# Patient Record
Sex: MALE | Race: WHITE | Employment: FULL TIME | URBAN - METROPOLITAN AREA
[De-identification: names, ages, dates, MRNs, and addresses within clinical notes are randomized per-mention and may not be internally consistent; named-entity substitution may affect disease eponyms.]

---

## 2021-01-14 ENCOUNTER — HOSPITAL ENCOUNTER (EMERGENCY)
Age: 51
Discharge: HOSPICE | End: 2021-01-15
Attending: EMERGENCY MEDICINE | Admitting: ORTHOPAEDIC SURGERY
Payer: COMMERCIAL

## 2021-01-14 ENCOUNTER — APPOINTMENT (OUTPATIENT)
Dept: CT IMAGING | Age: 51
End: 2021-01-14
Payer: COMMERCIAL

## 2021-01-14 DIAGNOSIS — L03.113 CELLULITIS OF HAND, RIGHT: Primary | ICD-10-CM

## 2021-01-14 PROBLEM — L03.90 CELLULITIS: Status: ACTIVE | Noted: 2021-01-14

## 2021-01-14 LAB
ALBUMIN SERPL-MCNC: 4.2 G/DL (ref 3.5–5.2)
ALP BLD-CCNC: 136 U/L (ref 40–129)
ALT SERPL-CCNC: 18 U/L (ref 0–40)
ANION GAP SERPL CALCULATED.3IONS-SCNC: 13 MMOL/L (ref 7–16)
AST SERPL-CCNC: 14 U/L (ref 0–39)
BASOPHILS ABSOLUTE: 0.05 E9/L (ref 0–0.2)
BASOPHILS RELATIVE PERCENT: 0.4 % (ref 0–2)
BILIRUB SERPL-MCNC: 0.4 MG/DL (ref 0–1.2)
BUN BLDV-MCNC: 24 MG/DL (ref 6–20)
C-REACTIVE PROTEIN: 22.2 MG/DL (ref 0–0.4)
CALCIUM SERPL-MCNC: 10.1 MG/DL (ref 8.6–10.2)
CHLORIDE BLD-SCNC: 103 MMOL/L (ref 98–107)
CO2: 23 MMOL/L (ref 22–29)
CREAT SERPL-MCNC: 0.8 MG/DL (ref 0.7–1.2)
EOSINOPHILS ABSOLUTE: 0.12 E9/L (ref 0.05–0.5)
EOSINOPHILS RELATIVE PERCENT: 0.9 % (ref 0–6)
GFR AFRICAN AMERICAN: >60
GFR NON-AFRICAN AMERICAN: >60 ML/MIN/1.73
GLUCOSE BLD-MCNC: 215 MG/DL (ref 74–99)
HCT VFR BLD CALC: 43.4 % (ref 37–54)
HEMOGLOBIN: 14.7 G/DL (ref 12.5–16.5)
IMMATURE GRANULOCYTES #: 0.13 E9/L
IMMATURE GRANULOCYTES %: 1 % (ref 0–5)
LYMPHOCYTES ABSOLUTE: 1.83 E9/L (ref 1.5–4)
LYMPHOCYTES RELATIVE PERCENT: 13.7 % (ref 20–42)
MCH RBC QN AUTO: 28.3 PG (ref 26–35)
MCHC RBC AUTO-ENTMCNC: 33.9 % (ref 32–34.5)
MCV RBC AUTO: 83.6 FL (ref 80–99.9)
MONOCYTES ABSOLUTE: 1.04 E9/L (ref 0.1–0.95)
MONOCYTES RELATIVE PERCENT: 7.8 % (ref 2–12)
NEUTROPHILS ABSOLUTE: 10.2 E9/L (ref 1.8–7.3)
NEUTROPHILS RELATIVE PERCENT: 76.2 % (ref 43–80)
PDW BLD-RTO: 14 FL (ref 11.5–15)
PLATELET # BLD: 294 E9/L (ref 130–450)
PMV BLD AUTO: 9.3 FL (ref 7–12)
POTASSIUM SERPL-SCNC: 4.1 MMOL/L (ref 3.5–5)
RBC # BLD: 5.19 E12/L (ref 3.8–5.8)
SODIUM BLD-SCNC: 139 MMOL/L (ref 132–146)
TOTAL PROTEIN: 8.6 G/DL (ref 6.4–8.3)
URIC ACID, SERUM: 4.9 MG/DL (ref 3.4–7)
WBC # BLD: 13.4 E9/L (ref 4.5–11.5)

## 2021-01-14 PROCEDURE — 86140 C-REACTIVE PROTEIN: CPT

## 2021-01-14 PROCEDURE — 2500000003 HC RX 250 WO HCPCS: Performed by: EMERGENCY MEDICINE

## 2021-01-14 PROCEDURE — 96375 TX/PRO/DX INJ NEW DRUG ADDON: CPT | Performed by: EMERGENCY MEDICINE

## 2021-01-14 PROCEDURE — 99285 EMERGENCY DEPT VISIT HI MDM: CPT

## 2021-01-14 PROCEDURE — 2580000003 HC RX 258: Performed by: EMERGENCY MEDICINE

## 2021-01-14 PROCEDURE — 84550 ASSAY OF BLOOD/URIC ACID: CPT

## 2021-01-14 PROCEDURE — 73202 CT UPPR EXTREMITY W/O&W/DYE: CPT

## 2021-01-14 PROCEDURE — 85025 COMPLETE CBC W/AUTO DIFF WBC: CPT

## 2021-01-14 PROCEDURE — 6360000002 HC RX W HCPCS: Performed by: EMERGENCY MEDICINE

## 2021-01-14 PROCEDURE — 80053 COMPREHEN METABOLIC PANEL: CPT

## 2021-01-14 PROCEDURE — 2580000003 HC RX 258: Performed by: PHYSICIAN ASSISTANT

## 2021-01-14 PROCEDURE — 96365 THER/PROPH/DIAG IV INF INIT: CPT | Performed by: EMERGENCY MEDICINE

## 2021-01-14 PROCEDURE — 6360000004 HC RX CONTRAST MEDICATION: Performed by: RADIOLOGY

## 2021-01-14 PROCEDURE — 96376 TX/PRO/DX INJ SAME DRUG ADON: CPT | Performed by: EMERGENCY MEDICINE

## 2021-01-14 PROCEDURE — 87040 BLOOD CULTURE FOR BACTERIA: CPT

## 2021-01-14 PROCEDURE — 96367 TX/PROPH/DG ADDL SEQ IV INF: CPT | Performed by: EMERGENCY MEDICINE

## 2021-01-14 PROCEDURE — 99285 EMERGENCY DEPT VISIT HI MDM: CPT | Performed by: EMERGENCY MEDICINE

## 2021-01-14 PROCEDURE — 6360000002 HC RX W HCPCS: Performed by: PHYSICIAN ASSISTANT

## 2021-01-14 RX ORDER — 0.9 % SODIUM CHLORIDE 0.9 %
500 INTRAVENOUS SOLUTION INTRAVENOUS ONCE
Status: COMPLETED | OUTPATIENT
Start: 2021-01-14 | End: 2021-01-14

## 2021-01-14 RX ORDER — MORPHINE SULFATE 4 MG/ML
4 INJECTION, SOLUTION INTRAMUSCULAR; INTRAVENOUS ONCE
Status: COMPLETED | OUTPATIENT
Start: 2021-01-14 | End: 2021-01-14

## 2021-01-14 RX ORDER — FENTANYL CITRATE 50 UG/ML
100 INJECTION, SOLUTION INTRAMUSCULAR; INTRAVENOUS ONCE
Status: COMPLETED | OUTPATIENT
Start: 2021-01-14 | End: 2021-01-14

## 2021-01-14 RX ORDER — ATORVASTATIN CALCIUM 40 MG/1
40 TABLET, FILM COATED ORAL DAILY
COMMUNITY

## 2021-01-14 RX ORDER — LEVOTHYROXINE SODIUM 0.1 MG/1
100 TABLET ORAL DAILY
COMMUNITY

## 2021-01-14 RX ORDER — ONDANSETRON 2 MG/ML
4 INJECTION INTRAMUSCULAR; INTRAVENOUS ONCE
Status: COMPLETED | OUTPATIENT
Start: 2021-01-14 | End: 2021-01-14

## 2021-01-14 RX ORDER — EMPAGLIFLOZIN 25 MG/1
25 TABLET, FILM COATED ORAL DAILY
COMMUNITY

## 2021-01-14 RX ORDER — FENTANYL CITRATE 50 UG/ML
50 INJECTION, SOLUTION INTRAMUSCULAR; INTRAVENOUS ONCE
Status: COMPLETED | OUTPATIENT
Start: 2021-01-14 | End: 2021-01-14

## 2021-01-14 RX ORDER — SUMATRIPTAN 100 MG/1
100 TABLET, FILM COATED ORAL
COMMUNITY

## 2021-01-14 RX ADMIN — DOXYCYCLINE 100 MG: 100 INJECTION, POWDER, LYOPHILIZED, FOR SOLUTION INTRAVENOUS at 19:57

## 2021-01-14 RX ADMIN — IOPAMIDOL 75 ML: 755 INJECTION, SOLUTION INTRAVENOUS at 18:03

## 2021-01-14 RX ADMIN — ONDANSETRON 4 MG: 2 INJECTION INTRAMUSCULAR; INTRAVENOUS at 17:05

## 2021-01-14 RX ADMIN — MORPHINE SULFATE 4 MG: 4 INJECTION, SOLUTION INTRAMUSCULAR; INTRAVENOUS at 17:54

## 2021-01-14 RX ADMIN — MORPHINE SULFATE 4 MG: 4 INJECTION, SOLUTION INTRAMUSCULAR; INTRAVENOUS at 17:07

## 2021-01-14 RX ADMIN — HYDROMORPHONE HYDROCHLORIDE 0.5 MG: 1 INJECTION, SOLUTION INTRAMUSCULAR; INTRAVENOUS; SUBCUTANEOUS at 23:55

## 2021-01-14 RX ADMIN — FENTANYL CITRATE 50 MCG: 50 INJECTION, SOLUTION INTRAMUSCULAR; INTRAVENOUS at 18:52

## 2021-01-14 RX ADMIN — FENTANYL CITRATE 100 MCG: 50 INJECTION, SOLUTION INTRAMUSCULAR; INTRAVENOUS at 19:54

## 2021-01-14 RX ADMIN — SODIUM CHLORIDE 3 G: 900 INJECTION INTRAVENOUS at 21:01

## 2021-01-14 RX ADMIN — SODIUM CHLORIDE 500 ML: 9 INJECTION, SOLUTION INTRAVENOUS at 17:04

## 2021-01-14 RX ADMIN — HYDROMORPHONE HYDROCHLORIDE 1 MG: 1 INJECTION, SOLUTION INTRAMUSCULAR; INTRAVENOUS; SUBCUTANEOUS at 20:58

## 2021-01-14 ASSESSMENT — PAIN SCALES - GENERAL
PAINLEVEL_OUTOF10: 10
PAINLEVEL_OUTOF10: 8
PAINLEVEL_OUTOF10: 9
PAINLEVEL_OUTOF10: 5
PAINLEVEL_OUTOF10: 9
PAINLEVEL_OUTOF10: 9

## 2021-01-14 NOTE — ED PROVIDER NOTES
ED Attending  CC: Ashley           [facility]  Department of Emergency Medicine   ED  Encounter Note  Admit Date/RoomTime: 2021  3:54 PM  ED Room:     NAME: Jordan Gill  : 1970  MRN: [id number]     Chief Complaint:  Hand Pain (right, swelling x1 week, states \"jammed his thumb\")    History of Present Illness       Jordan Gill is a 44 y.o. old male presenting to the emergency department by private vehicle, for traumatic Right hand and wrist pain which occured 7 day(s) prior to arrival.  The complaint is due to injury of the right thumb and wrist one week ago. Patient reports he felt it was a minor injury when he stubbed his thumb at work. 2 days later his index finger and middle finger were significantly swollen so he followed up at a local hospital where x-rays were negative. There was concern for gout so they started him on indomethacin. He followed up with his primary care doctor when it continued to worsen who started him on Keflex and Bactrim. The next day, today, he followed up with orthopedic surgeon who advised him to come directly to this hospital concern for deep tissue infection. He is right handed. Patient has no prior history of pain/injury with regards to today's visit. The patients tetanus status is within the last 2 years. Since onset the symptoms have been worsening. His pain is aggraveated by any use of, pressure on or palpation of or elevating his arm and relieved by nothing. Pt is diabetic. No history of IVDU. ROS   Pertinent positives and negatives are stated within HPI, all other systems reviewed and are negative. Past Medical History:  has a past medical history of Diabetes mellitus ([address] Hyperlipidemia, and Thyroid disease. Surgical History:  has no past surgical history on file. Social History:  reports that he has quit smoking. He has never used smokeless tobacco. He reports current alcohol use.  He reports that he does not use drugs. Family History: family history is not on file. Allergies: Patient has no known allergies. Physical Exam   Oxygen Saturation Interpretation: Normal.        ED Triage Vitals [01/14/21 1551]   BP Temp Temp Source Pulse Resp SpO2 Height Weight   (!) 151/84 98.9 °F (37.2 °C) Temporal 82 16 94 % 6' (1.829 m) 230 lb (104.3 kg)         Constitutional:  Alert, development consistent with age. Neck:  Normal ROM. Supple. Non-tender. Hand: Right dorsal & volar hand            Tenderness: severe. Swelling: Severe. Especially dorsal hand, volar hand non tender. Deformity: no.               Skin:  warmth, tenderness, swelling and lymphatic streaks. Neurovascular: Motor deficit: none. Sensory deficit:   none. Pulse deficit: none. Capillary refill: normal.  Fingers:  all, especially 1,2,3            Tenderness:  mild. Swelling: Moderate. Deformity: no.              ROM: diminished range with pain, diminished range due to swelling. Skin:  Erythema of 2nd and 3rd fingers. Wrist:               Tenderness:  Severe dorsal and at snuff box. Swelling: Moderate. Deformity: no.             ROM: diminished range with pain. Skin:  warmth, tenderness, swelling and lymphatic streaks. Lymphatics: No lymphangitis or adenopathy noted. Neurological:  Oriented. Motor functions intact. t.         Lab / Imaging Results   (All laboratory and radiology results have been personally reviewed by myself)  Labs:  Results for orders placed or performed during the hospital encounter of 01/14/21   CBC Auto Differential   Result Value Ref Range    WBC 13.4 (H) 4.5 - 11.5 E9/L    RBC 5.19 3.80 - 5.80 E12/L    Hemoglobin 14.7 12.5 - 16.5 g/dL    Hematocrit 43.4 37.0 - 54.0 %    MCV 83.6 80.0 - 99.9 fL    MCH 28.3 26.0 - 35.0 pg    MCHC 33.9 32.0 - 34.5 %    RDW 14.0 11.5 - 15.0 fL Platelets 808 121 - 889 E9/L    MPV 9.3 7.0 - 12.0 fL    Neutrophils % 76.2 43.0 - 80.0 %    Immature Granulocytes % 1.0 0.0 - 5.0 %    Lymphocytes % 13.7 (L) 20.0 - 42.0 %    Monocytes % 7.8 2.0 - 12.0 %    Eosinophils % 0.9 0.0 - 6.0 %    Basophils % 0.4 0.0 - 2.0 %    Neutrophils Absolute 10.20 (H) 1.80 - 7.30 E9/L    Immature Granulocytes # 0.13 E9/L    Lymphocytes Absolute 1.83 1.50 - 4.00 E9/L    Monocytes Absolute 1.04 (H) 0.10 - 0.95 E9/L    Eosinophils Absolute 0.12 0.05 - 0.50 E9/L    Basophils Absolute 0.05 0.00 - 0.20 E9/L   Comprehensive Metabolic Panel   Result Value Ref Range    Sodium 139 132 - 146 mmol/L    Potassium 4.1 3.5 - 5.0 mmol/L    Chloride 103 98 - 107 mmol/L    CO2 23 22 - 29 mmol/L    Anion Gap 13 7 - 16 mmol/L    Glucose 215 (H) 74 - 99 mg/dL    BUN 24 (H) 6 - 20 mg/dL    CREATININE 0.8 0.7 - 1.2 mg/dL    GFR Non-African American >60 >=60 mL/min/1.73    GFR African American >60     Calcium 10.1 8.6 - 10.2 mg/dL    Total Protein 8.6 (H) 6.4 - 8.3 g/dL    Alb 4.2 3.5 - 5.2 g/dL    Total Bilirubin 0.4 0.0 - 1.2 mg/dL    Alkaline Phosphatase 136 (H) 40 - 129 U/L    ALT 18 0 - 40 U/L    AST 14 0 - 39 U/L   Uric acid   Result Value Ref Range    Uric Acid, Serum 4.9 3.4 - 7.0 mg/dL   C-reactive protein   Result Value Ref Range    CRP 22.2 (H) 0.0 - 0.4 mg/dL     Imaging: All Radiology results interpreted by Radiologist unless otherwise noted. CT WRIST RIGHT W WO CONTRAST   Final Result   1. No acute osseous abnormality. 2. Dorsal subcutaneous edema compatible with reactive edema or cellulitis.         ED Course / Medical Decision Making     Medications   HYDROmorphone (DILAUDID) injection 0.5 mg (has no administration in time range)   0.9 % sodium chloride bolus (0 mLs Intravenous Stopped 1/14/21 1740)   morphine sulfate (PF) injection 4 mg (4 mg Intravenous Given 1/14/21 1707)   ondansetron (ZOFRAN) injection 4 mg (4 mg Intravenous Given 1/14/21 1705)   iopamidol (ISOVUE-370) 76 % injection 75 mL (75 mLs Intravenous Given 1/14/21 1803)   morphine sulfate (PF) injection 4 mg (4 mg Intravenous Given 1/14/21 1754)   fentaNYL (SUBLIMAZE) injection 50 mcg (50 mcg Intravenous Given 1/14/21 1852)   ampicillin-sulbactam (UNASYN) 3 g ivpb minibag (0 g Intravenous Stopped 1/14/21 2139)   doxycycline (VIBRAMYCIN) 100 mg in dextrose 5 % 100 mL IVPB (0 mg Intravenous Stopped 1/14/21 2104)   fentaNYL (SUBLIMAZE) injection 100 mcg (100 mcg Intravenous Given 1/14/21 1954)   HYDROmorphone (DILAUDID) injection 1 mg (1 mg Intravenous Given 1/14/21 2058)   HYDROmorphone (DILAUDID) injection 0.5 mg (0.5 mg Intravenous Given 1/14/21 2355)     ED Course as of Josafat 15 0043   Thu Jan 14, 2021 2102 Pulses intact, pain somewhat improved but still persists. Still able to move digits but pain with hand and wrist movements    [BP]   2108 Spoke with Dr. Katerin Pete who agreed to admit    [BP]   [id number] Patient's pain significantly improved. Compartments soft    [BP]      ED Course User Index  [BP] Guero Khalil DO     Re-examination:  1/14/21       Time: 1730 pt  Pain is still present, morphine took edge off slightly, pt still pacing the room, more morphine ordered. Consult(s):   IP CONSULT TO ORTHOPEDIC SURGERY  IP CONSULT TO INTERNAL MEDICINE    Procedure(s):   none    MDM:            Assessment      1. Cellulitis of hand, right      Plan   Admission Inpatient to General Medical Floor. Patient condition is stable    New Medications     New Prescriptions    No medications on file     Electronically signed by Guero Khalil DO   DD: 1/14/21  **This report was transcribed using voice recognition software. Every effort was made to ensure accuracy; however, inadvertent computerized transcription errors may be present.   END OF ED PROVIDER NOTE      Department of Emergency Medicine    ED  Provider Note - Sign out / Oncoming Provider   Admit Date/RoomTime: 1/14/2021  3:54 PM  12:42 AM EST      I received sign out from [facility] and took over and became the primary provider   I have discussed the patient's initial exam, treatment and plan of care with the out going physician. I have introduced my self to the patient / family and have answered their questions to this point. I have examined the patient myself and reviewed ordered tests / medications and  reviewed any available results to this point. If a resident is involved in the Emergency Department care, I have discussed my findings and plan with them as well. MDM:     I, Dr. Aida Vieyra am the primary provider of record    Patient is a 49-year-old male who was initially seen by Denys Klinefelter however she went off shift and while the work up was pending, I took over the care of the case given its complexity . Patient had significant swelling to his right hand that was warm to touch with range of motion was limited secondary to pain. I had a great concern for possible deep cellulitis versus abscess may be causing a potential compartment syndrome. Patient was treated with analgesics provided some relief his pain did return. She is then given multiple dose of Dilaudid which did ultimately control his pain. Patient was rechecked multiple times here in the department again as I had a high concern for compartment syndrome. Patient's compartments were compressible he had palpable pulses and was neurovascular intact. CT imaging was concerning for cellulitis he was started here with IV antibiotics. I did discuss the case with Dr. Chavo Feng who stated that if patient's symptoms worsened he would consult at [facility] ([facility]). The patient given his return of pain and continued swelling as well as elevated CRP and leukocytosis, I sought it best to admit the patinet. Case was discussed with Dr. Dora Pendleton who agreed to admit. Please note that the withdrawal or failure to initiate urgent interventions for this patient would likely result in a life threatening deterioration or permanent disability.   Systems at risk for deterioration include: CV/MSK    Accordingly this patient received 31 minutes of critical care time, excluding separately billable procedures. Time: 0353  Re-evaluation. Patients symptoms are improving  Repeat physical examination is not changed       --------------------------------- IMPRESSION AND DISPOSITION ---------------------------------    IMPRESSION  1.  Cellulitis of hand, right        DISPOSITION  Disposition: Admit to med/surg floor via transfer to [facility] ([facility])  Patient condition is stable           Jeffersonanyi WilsonDO  01/15/21 0047

## 2021-01-15 ENCOUNTER — HOSPITAL ENCOUNTER (INPATIENT)
Age: 51
LOS: 4 days | Discharge: HOME | DRG: 506 | End: 2021-01-19
Attending: EMERGENCY MEDICINE | Admitting: EMERGENCY MEDICINE
Payer: COMMERCIAL

## 2021-01-15 VITALS
DIASTOLIC BLOOD PRESSURE: 89 MMHG | OXYGEN SATURATION: 96 % | SYSTOLIC BLOOD PRESSURE: 164 MMHG | HEIGHT: 72 IN | WEIGHT: 230 LBS | BODY MASS INDEX: 31.15 KG/M2 | RESPIRATION RATE: 16 BRPM | TEMPERATURE: 98.3 F | HEART RATE: 98 BPM

## 2021-01-15 DIAGNOSIS — M00.9 PYOGENIC ARTHRITIS OF RIGHT WRIST, DUE TO UNSPECIFIED ORGANISM (HCC): Primary | ICD-10-CM

## 2021-01-15 LAB
CHP ED QC CHECK: YES
GLUCOSE BLD-MCNC: 110 MG/DL
METER GLUCOSE: 110 MG/DL (ref 74–99)

## 2021-01-15 PROCEDURE — 2580000003 HC RX 258: Performed by: HOSPITALIST

## 2021-01-15 PROCEDURE — 96375 TX/PRO/DX INJ NEW DRUG ADDON: CPT | Performed by: EMERGENCY MEDICINE

## 2021-01-15 PROCEDURE — 1200000000 HC SEMI PRIVATE

## 2021-01-15 PROCEDURE — 2580000003 HC RX 258: Performed by: EMERGENCY MEDICINE

## 2021-01-15 PROCEDURE — 6370000000 HC RX 637 (ALT 250 FOR IP): Performed by: EMERGENCY MEDICINE

## 2021-01-15 PROCEDURE — 6360000002 HC RX W HCPCS: Performed by: EMERGENCY MEDICINE

## 2021-01-15 PROCEDURE — 82962 GLUCOSE BLOOD TEST: CPT

## 2021-01-15 PROCEDURE — 96365 THER/PROPH/DIAG IV INF INIT: CPT | Performed by: EMERGENCY MEDICINE

## 2021-01-15 PROCEDURE — 2500000003 HC RX 250 WO HCPCS: Performed by: HOSPITALIST

## 2021-01-15 PROCEDURE — 96367 TX/PROPH/DG ADDL SEQ IV INF: CPT | Performed by: EMERGENCY MEDICINE

## 2021-01-15 PROCEDURE — 6370000000 HC RX 637 (ALT 250 FOR IP): Performed by: HOSPITALIST

## 2021-01-15 PROCEDURE — 96366 THER/PROPH/DIAG IV INF ADDON: CPT | Performed by: EMERGENCY MEDICINE

## 2021-01-15 PROCEDURE — 96376 TX/PRO/DX INJ SAME DRUG ADON: CPT | Performed by: EMERGENCY MEDICINE

## 2021-01-15 PROCEDURE — 6360000002 HC RX W HCPCS: Performed by: HOSPITALIST

## 2021-01-15 RX ORDER — ACETAMINOPHEN 650 MG/1
650 SUPPOSITORY RECTAL EVERY 6 HOURS PRN
Status: CANCELLED | OUTPATIENT
Start: 2021-01-15

## 2021-01-15 RX ORDER — POLYETHYLENE GLYCOL 3350 17 G/17G
17 POWDER, FOR SOLUTION ORAL DAILY PRN
Status: DISCONTINUED | OUTPATIENT
Start: 2021-01-15 | End: 2021-01-19 | Stop reason: HOSPADM

## 2021-01-15 RX ORDER — LEVOTHYROXINE SODIUM 0.05 MG/1
50 TABLET ORAL DAILY
Status: DISCONTINUED | OUTPATIENT
Start: 2021-01-15 | End: 2021-01-15 | Stop reason: HOSPADM

## 2021-01-15 RX ORDER — SODIUM CHLORIDE 0.9 % (FLUSH) 0.9 %
10 SYRINGE (ML) INJECTION PRN
Status: DISCONTINUED | OUTPATIENT
Start: 2021-01-15 | End: 2021-01-15 | Stop reason: HOSPADM

## 2021-01-15 RX ORDER — HYDROCODONE BITARTRATE AND ACETAMINOPHEN 5; 325 MG/1; MG/1
2 TABLET ORAL EVERY 4 HOURS PRN
Status: DISCONTINUED | OUTPATIENT
Start: 2021-01-15 | End: 2021-01-15 | Stop reason: HOSPADM

## 2021-01-15 RX ORDER — POTASSIUM CHLORIDE 7.45 MG/ML
10 INJECTION INTRAVENOUS PRN
Status: CANCELLED | OUTPATIENT
Start: 2021-01-15

## 2021-01-15 RX ORDER — ACETAMINOPHEN 325 MG/1
650 TABLET ORAL EVERY 6 HOURS PRN
Status: CANCELLED | OUTPATIENT
Start: 2021-01-15

## 2021-01-15 RX ORDER — ATORVASTATIN CALCIUM 40 MG/1
40 TABLET, FILM COATED ORAL DAILY
Status: DISCONTINUED | OUTPATIENT
Start: 2021-01-15 | End: 2021-01-15 | Stop reason: HOSPADM

## 2021-01-15 RX ORDER — NICOTINE POLACRILEX 4 MG
15 LOZENGE BUCCAL PRN
Status: CANCELLED | OUTPATIENT
Start: 2021-01-15

## 2021-01-15 RX ORDER — PROMETHAZINE HYDROCHLORIDE 25 MG/1
12.5 TABLET ORAL EVERY 6 HOURS PRN
Status: CANCELLED | OUTPATIENT
Start: 2021-01-15

## 2021-01-15 RX ORDER — SODIUM CHLORIDE 0.9 % (FLUSH) 0.9 %
10 SYRINGE (ML) INJECTION EVERY 12 HOURS SCHEDULED
Status: DISCONTINUED | OUTPATIENT
Start: 2021-01-15 | End: 2021-01-17 | Stop reason: SDUPTHER

## 2021-01-15 RX ORDER — LEVOTHYROXINE SODIUM 0.05 MG/1
50 TABLET ORAL DAILY
Status: DISCONTINUED | OUTPATIENT
Start: 2021-01-16 | End: 2021-01-18

## 2021-01-15 RX ORDER — HYDROCODONE BITARTRATE AND ACETAMINOPHEN 5; 325 MG/1; MG/1
1 TABLET ORAL EVERY 4 HOURS PRN
Status: DISCONTINUED | OUTPATIENT
Start: 2021-01-15 | End: 2021-01-15 | Stop reason: HOSPADM

## 2021-01-15 RX ORDER — DEXTROSE MONOHYDRATE 25 G/50ML
12.5 INJECTION, SOLUTION INTRAVENOUS PRN
Status: CANCELLED | OUTPATIENT
Start: 2021-01-15

## 2021-01-15 RX ORDER — SODIUM CHLORIDE 9 MG/ML
INJECTION, SOLUTION INTRAVENOUS CONTINUOUS
Status: DISCONTINUED | OUTPATIENT
Start: 2021-01-15 | End: 2021-01-15 | Stop reason: HOSPADM

## 2021-01-15 RX ORDER — ACETAMINOPHEN 650 MG/1
650 SUPPOSITORY RECTAL EVERY 6 HOURS PRN
Status: DISCONTINUED | OUTPATIENT
Start: 2021-01-15 | End: 2021-01-19 | Stop reason: HOSPADM

## 2021-01-15 RX ORDER — ONDANSETRON 2 MG/ML
4 INJECTION INTRAMUSCULAR; INTRAVENOUS EVERY 6 HOURS PRN
Status: CANCELLED | OUTPATIENT
Start: 2021-01-15

## 2021-01-15 RX ORDER — SODIUM CHLORIDE 0.9 % (FLUSH) 0.9 %
10 SYRINGE (ML) INJECTION PRN
Status: DISCONTINUED | OUTPATIENT
Start: 2021-01-15 | End: 2021-01-17 | Stop reason: SDUPTHER

## 2021-01-15 RX ORDER — POLYETHYLENE GLYCOL 3350 17 G/17G
17 POWDER, FOR SOLUTION ORAL DAILY PRN
Status: CANCELLED | OUTPATIENT
Start: 2021-01-15

## 2021-01-15 RX ORDER — ONDANSETRON 2 MG/ML
4 INJECTION INTRAMUSCULAR; INTRAVENOUS EVERY 6 HOURS PRN
Status: DISCONTINUED | OUTPATIENT
Start: 2021-01-15 | End: 2021-01-19 | Stop reason: HOSPADM

## 2021-01-15 RX ORDER — ATORVASTATIN CALCIUM 40 MG/1
40 TABLET, FILM COATED ORAL DAILY
Status: DISCONTINUED | OUTPATIENT
Start: 2021-01-16 | End: 2021-01-19 | Stop reason: HOSPADM

## 2021-01-15 RX ORDER — OXYCODONE AND ACETAMINOPHEN 10; 325 MG/1; MG/1
1 TABLET ORAL EVERY 6 HOURS PRN
Status: DISCONTINUED | OUTPATIENT
Start: 2021-01-15 | End: 2021-01-19 | Stop reason: HOSPADM

## 2021-01-15 RX ORDER — PROMETHAZINE HYDROCHLORIDE 25 MG/1
12.5 TABLET ORAL EVERY 6 HOURS PRN
Status: DISCONTINUED | OUTPATIENT
Start: 2021-01-15 | End: 2021-01-19 | Stop reason: HOSPADM

## 2021-01-15 RX ORDER — DEXTROSE MONOHYDRATE 50 MG/ML
100 INJECTION, SOLUTION INTRAVENOUS PRN
Status: CANCELLED | OUTPATIENT
Start: 2021-01-15

## 2021-01-15 RX ORDER — SODIUM CHLORIDE 0.9 % (FLUSH) 0.9 %
10 SYRINGE (ML) INJECTION EVERY 12 HOURS SCHEDULED
Status: CANCELLED | OUTPATIENT
Start: 2021-01-15

## 2021-01-15 RX ORDER — ACETAMINOPHEN 325 MG/1
650 TABLET ORAL EVERY 6 HOURS PRN
Status: DISCONTINUED | OUTPATIENT
Start: 2021-01-15 | End: 2021-01-19 | Stop reason: HOSPADM

## 2021-01-15 RX ORDER — SUMATRIPTAN 50 MG/1
100 TABLET, FILM COATED ORAL
Status: DISPENSED | OUTPATIENT
Start: 2021-01-15 | End: 2021-01-15

## 2021-01-15 RX ORDER — POTASSIUM CHLORIDE 20 MEQ/1
40 TABLET, EXTENDED RELEASE ORAL PRN
Status: CANCELLED | OUTPATIENT
Start: 2021-01-15

## 2021-01-15 RX ORDER — MORPHINE SULFATE 4 MG/ML
4 INJECTION, SOLUTION INTRAMUSCULAR; INTRAVENOUS EVERY 4 HOURS PRN
Status: DISCONTINUED | OUTPATIENT
Start: 2021-01-15 | End: 2021-01-15 | Stop reason: HOSPADM

## 2021-01-15 RX ADMIN — LEVOTHYROXINE SODIUM 50 MCG: 50 TABLET ORAL at 12:50

## 2021-01-15 RX ADMIN — HYDROMORPHONE HYDROCHLORIDE 1 MG: 1 INJECTION, SOLUTION INTRAMUSCULAR; INTRAVENOUS; SUBCUTANEOUS at 05:43

## 2021-01-15 RX ADMIN — HYDROMORPHONE HYDROCHLORIDE 0.5 MG: 1 INJECTION, SOLUTION INTRAMUSCULAR; INTRAVENOUS; SUBCUTANEOUS at 07:18

## 2021-01-15 RX ADMIN — ATORVASTATIN CALCIUM 40 MG: 40 TABLET, FILM COATED ORAL at 12:50

## 2021-01-15 RX ADMIN — SODIUM CHLORIDE: 9 INJECTION, SOLUTION INTRAVENOUS at 10:49

## 2021-01-15 RX ADMIN — SODIUM CHLORIDE 3 G: 900 INJECTION INTRAVENOUS at 14:18

## 2021-01-15 RX ADMIN — OXYCODONE HYDROCHLORIDE AND ACETAMINOPHEN 1 TABLET: 10; 325 TABLET ORAL at 22:03

## 2021-01-15 RX ADMIN — MORPHINE SULFATE 4 MG: 4 INJECTION, SOLUTION INTRAMUSCULAR; INTRAVENOUS at 12:57

## 2021-01-15 RX ADMIN — ENOXAPARIN SODIUM 40 MG: 40 INJECTION SUBCUTANEOUS at 20:43

## 2021-01-15 RX ADMIN — HYDROCODONE BITARTRATE AND ACETAMINOPHEN 2 TABLET: 5; 325 TABLET ORAL at 15:46

## 2021-01-15 RX ADMIN — SODIUM CHLORIDE 3 G: 900 INJECTION INTRAVENOUS at 05:46

## 2021-01-15 RX ADMIN — DOXYCYCLINE 100 MG: 100 INJECTION, POWDER, LYOPHILIZED, FOR SOLUTION INTRAVENOUS at 10:50

## 2021-01-15 RX ADMIN — VANCOMYCIN HYDROCHLORIDE 1750 MG: 10 INJECTION, POWDER, LYOPHILIZED, FOR SOLUTION INTRAVENOUS at 23:36

## 2021-01-15 RX ADMIN — AMPICILLIN SODIUM AND SULBACTAM SODIUM 3 G: 2; 1 INJECTION, POWDER, FOR SOLUTION INTRAMUSCULAR; INTRAVENOUS at 22:57

## 2021-01-15 RX ADMIN — Medication 10 ML: at 20:43

## 2021-01-15 RX ADMIN — HYDROMORPHONE HYDROCHLORIDE 0.5 MG: 1 INJECTION, SOLUTION INTRAMUSCULAR; INTRAVENOUS; SUBCUTANEOUS at 03:54

## 2021-01-15 ASSESSMENT — PAIN SCALES - GENERAL
PAINLEVEL_OUTOF10: 5
PAINLEVEL_OUTOF10: 2
PAINLEVEL_OUTOF10: 8
PAINLEVEL_OUTOF10: 2
PAINLEVEL_OUTOF10: 7
PAINLEVEL_OUTOF10: 7
PAINLEVEL_OUTOF10: 9
PAINLEVEL_OUTOF10: 10
PAINLEVEL_OUTOF10: 7

## 2021-01-15 ASSESSMENT — PAIN DESCRIPTION - FREQUENCY
FREQUENCY: CONTINUOUS

## 2021-01-15 ASSESSMENT — PAIN DESCRIPTION - LOCATION: LOCATION: FINGER (COMMENT WHICH ONE);ARM

## 2021-01-15 ASSESSMENT — PAIN DESCRIPTION - ONSET
ONSET: AWAKENED FROM SLEEP
ONSET: ON-GOING

## 2021-01-15 ASSESSMENT — PAIN DESCRIPTION - ORIENTATION: ORIENTATION: RIGHT

## 2021-01-15 ASSESSMENT — PAIN DESCRIPTION - PAIN TYPE: TYPE: ACUTE PAIN

## 2021-01-15 ASSESSMENT — PAIN - FUNCTIONAL ASSESSMENT: PAIN_FUNCTIONAL_ASSESSMENT: PREVENTS OR INTERFERES SOME ACTIVE ACTIVITIES AND ADLS

## 2021-01-15 ASSESSMENT — PAIN DESCRIPTION - DESCRIPTORS: DESCRIPTORS: CONSTANT;ACHING;DISCOMFORT

## 2021-01-15 ASSESSMENT — PAIN DESCRIPTION - PROGRESSION: CLINICAL_PROGRESSION: NOT CHANGED

## 2021-01-15 NOTE — ED NOTES
Report called to [facility] (1-) room 8407 to Bryson Rayo. Patient waiting for transportation to facility.      Gorge Chung RN  01/15/21 1716

## 2021-01-15 NOTE — PROGRESS NOTES
@9366 access center notified of transfer to / Harish Starr hospitalist to accept/ Cristofer  Hand cellulitis  @4813 Dr. Shreyas Gann spoke with Dr. Shereen Zaidi hospitalist  @5200 access center reported no beds available until tomorrow/ discharge dependant

## 2021-01-15 NOTE — ED NOTES
Bed: 25  Expected date:   Expected time:   Means of arrival:   Comments:  350 Jos Medina RN  01/14/21 5485

## 2021-01-16 ENCOUNTER — ANESTHESIA EVENT (OUTPATIENT)
Dept: OPERATING ROOM | Age: 51
DRG: 506 | End: 2021-01-16
Payer: COMMERCIAL

## 2021-01-16 ENCOUNTER — APPOINTMENT (OUTPATIENT)
Dept: GENERAL RADIOLOGY | Age: 51
DRG: 506 | End: 2021-01-16
Attending: EMERGENCY MEDICINE
Payer: COMMERCIAL

## 2021-01-16 LAB
ABO/RH: NORMAL
ANTIBODY SCREEN: NORMAL
C-REACTIVE PROTEIN: 19.5 MG/DL (ref 0–0.4)
HCT VFR BLD CALC: 42.3 % (ref 37–54)
HEMOGLOBIN: 13.9 G/DL (ref 12.5–16.5)
MCH RBC QN AUTO: 27.7 PG (ref 26–35)
MCHC RBC AUTO-ENTMCNC: 32.9 % (ref 32–34.5)
MCV RBC AUTO: 84.3 FL (ref 80–99.9)
METER GLUCOSE: 125 MG/DL (ref 74–99)
METER GLUCOSE: 141 MG/DL (ref 74–99)
METER GLUCOSE: 171 MG/DL (ref 74–99)
METER GLUCOSE: 206 MG/DL (ref 74–99)
METER GLUCOSE: 227 MG/DL (ref 74–99)
PDW BLD-RTO: 14.2 FL (ref 11.5–15)
PLATELET # BLD: 289 E9/L (ref 130–450)
PMV BLD AUTO: 9.2 FL (ref 7–12)
RBC # BLD: 5.02 E12/L (ref 3.8–5.8)
SEDIMENTATION RATE, ERYTHROCYTE: 102 MM/HR (ref 0–15)
WBC # BLD: 11.7 E9/L (ref 4.5–11.5)

## 2021-01-16 PROCEDURE — 86900 BLOOD TYPING SEROLOGIC ABO: CPT

## 2021-01-16 PROCEDURE — 99222 1ST HOSP IP/OBS MODERATE 55: CPT | Performed by: ORTHOPAEDIC SURGERY

## 2021-01-16 PROCEDURE — 6360000002 HC RX W HCPCS: Performed by: SPECIALIST

## 2021-01-16 PROCEDURE — 2580000003 HC RX 258: Performed by: SPECIALIST

## 2021-01-16 PROCEDURE — 6360000002 HC RX W HCPCS: Performed by: ORTHOPAEDIC SURGERY

## 2021-01-16 PROCEDURE — 86901 BLOOD TYPING SEROLOGIC RH(D): CPT

## 2021-01-16 PROCEDURE — 1200000000 HC SEMI PRIVATE

## 2021-01-16 PROCEDURE — 86140 C-REACTIVE PROTEIN: CPT

## 2021-01-16 PROCEDURE — 85651 RBC SED RATE NONAUTOMATED: CPT

## 2021-01-16 PROCEDURE — 6360000002 HC RX W HCPCS: Performed by: EMERGENCY MEDICINE

## 2021-01-16 PROCEDURE — 2580000003 HC RX 258: Performed by: EMERGENCY MEDICINE

## 2021-01-16 PROCEDURE — 85027 COMPLETE CBC AUTOMATED: CPT

## 2021-01-16 PROCEDURE — 36415 COLL VENOUS BLD VENIPUNCTURE: CPT

## 2021-01-16 PROCEDURE — 6370000000 HC RX 637 (ALT 250 FOR IP): Performed by: EMERGENCY MEDICINE

## 2021-01-16 PROCEDURE — 71046 X-RAY EXAM CHEST 2 VIEWS: CPT

## 2021-01-16 PROCEDURE — 73130 X-RAY EXAM OF HAND: CPT

## 2021-01-16 PROCEDURE — 82962 GLUCOSE BLOOD TEST: CPT

## 2021-01-16 PROCEDURE — 86850 RBC ANTIBODY SCREEN: CPT

## 2021-01-16 RX ORDER — MORPHINE SULFATE 2 MG/ML
2 INJECTION, SOLUTION INTRAMUSCULAR; INTRAVENOUS EVERY 4 HOURS PRN
Status: DISCONTINUED | OUTPATIENT
Start: 2021-01-16 | End: 2021-01-19 | Stop reason: HOSPADM

## 2021-01-16 RX ADMIN — OXYCODONE HYDROCHLORIDE AND ACETAMINOPHEN 1 TABLET: 10; 325 TABLET ORAL at 12:40

## 2021-01-16 RX ADMIN — SODIUM CHLORIDE 3 G: 900 INJECTION INTRAVENOUS at 18:35

## 2021-01-16 RX ADMIN — Medication 10 ML: at 08:47

## 2021-01-16 RX ADMIN — SODIUM CHLORIDE 3 G: 900 INJECTION INTRAVENOUS at 12:38

## 2021-01-16 RX ADMIN — OXYCODONE HYDROCHLORIDE AND ACETAMINOPHEN 1 TABLET: 10; 325 TABLET ORAL at 06:15

## 2021-01-16 RX ADMIN — Medication 10 ML: at 21:08

## 2021-01-16 RX ADMIN — MORPHINE SULFATE 2 MG: 2 INJECTION, SOLUTION INTRAMUSCULAR; INTRAVENOUS at 15:36

## 2021-01-16 RX ADMIN — ENOXAPARIN SODIUM 40 MG: 40 INJECTION SUBCUTANEOUS at 08:43

## 2021-01-16 RX ADMIN — AMPICILLIN SODIUM AND SULBACTAM SODIUM 3 G: 2; 1 INJECTION, POWDER, FOR SOLUTION INTRAMUSCULAR; INTRAVENOUS at 06:17

## 2021-01-16 RX ADMIN — INSULIN LISPRO 2 UNITS: 100 INJECTION, SOLUTION INTRAVENOUS; SUBCUTANEOUS at 12:43

## 2021-01-16 RX ADMIN — ATORVASTATIN CALCIUM 40 MG: 40 TABLET, FILM COATED ORAL at 08:43

## 2021-01-16 RX ADMIN — DAPTOMYCIN 450 MG: 500 INJECTION, POWDER, LYOPHILIZED, FOR SOLUTION INTRAVENOUS at 14:30

## 2021-01-16 RX ADMIN — OXYCODONE HYDROCHLORIDE AND ACETAMINOPHEN 1 TABLET: 10; 325 TABLET ORAL at 18:37

## 2021-01-16 RX ADMIN — INSULIN LISPRO 2 UNITS: 100 INJECTION, SOLUTION INTRAVENOUS; SUBCUTANEOUS at 21:07

## 2021-01-16 RX ADMIN — MORPHINE SULFATE 2 MG: 2 INJECTION, SOLUTION INTRAMUSCULAR; INTRAVENOUS at 21:08

## 2021-01-16 RX ADMIN — VANCOMYCIN HYDROCHLORIDE 1.5 G: 10 INJECTION, POWDER, LYOPHILIZED, FOR SOLUTION INTRAVENOUS at 08:44

## 2021-01-16 RX ADMIN — LEVOTHYROXINE SODIUM 50 MCG: 0.05 TABLET ORAL at 06:14

## 2021-01-16 RX ADMIN — ONDANSETRON 4 MG: 2 INJECTION INTRAMUSCULAR; INTRAVENOUS at 15:37

## 2021-01-16 RX ADMIN — HYDROMORPHONE HYDROCHLORIDE 1 MG: 1 INJECTION, SOLUTION INTRAMUSCULAR; INTRAVENOUS; SUBCUTANEOUS at 04:22

## 2021-01-16 RX ADMIN — INSULIN LISPRO 4 UNITS: 100 INJECTION, SOLUTION INTRAVENOUS; SUBCUTANEOUS at 18:35

## 2021-01-16 ASSESSMENT — PAIN SCALES - GENERAL
PAINLEVEL_OUTOF10: 8
PAINLEVEL_OUTOF10: 9

## 2021-01-16 ASSESSMENT — PAIN DESCRIPTION - DESCRIPTORS: DESCRIPTORS: ACHING;DISCOMFORT;NUMBNESS

## 2021-01-16 ASSESSMENT — PAIN - FUNCTIONAL ASSESSMENT: PAIN_FUNCTIONAL_ASSESSMENT: PREVENTS OR INTERFERES SOME ACTIVE ACTIVITIES AND ADLS

## 2021-01-16 ASSESSMENT — PAIN DESCRIPTION - PROGRESSION: CLINICAL_PROGRESSION: NOT CHANGED

## 2021-01-16 ASSESSMENT — PAIN DESCRIPTION - ONSET: ONSET: ON-GOING

## 2021-01-16 ASSESSMENT — PAIN DESCRIPTION - ORIENTATION: ORIENTATION: RIGHT

## 2021-01-16 ASSESSMENT — PAIN DESCRIPTION - LOCATION: LOCATION: HAND;WRIST

## 2021-01-16 ASSESSMENT — PAIN DESCRIPTION - PAIN TYPE: TYPE: ACUTE PAIN

## 2021-01-16 NOTE — CONSULTS
Department of Orthopedic Surgery  Resident Consult Note          Reason for Consult: Right arm pain    HISTORY OF PRESENT ILLNESS:       Patient is a 44 y.o. right-hand-dominant male who presents with right arm pain which began roughly 9 to 10 days ago. Patient states he was at work when he stubbed his thumb. He did not notice any obvious cuts or deformities to the skin at the time of the injury. He continued about his daily activities afterwards. However in the following days, patient began to experience significant pain and swelling in his thumb, index, and middle fingers. He decided to go to [facility] ER initially. He was diagnosed with gout and was instructed to follow-up with his primary care doctor. His primary doctor started him on Keflex and Bactrim and suggested that he follow-up with orthopedic surgeon. The orthopedic provider suggested he be evaluated at [facility] ER due to persistent pain and swelling which had migrated proximally into the wrist, forearm, and elbow. Patient eventually was [facility] Patient arrived yesterday evening and was started empirically on vancomycin and Unasyn. He states since beginning on these antibiotics yesterday evening his pain and swelling has improved slightly. Currently, patient admits to pain diffusely in his right upper extremity spanning from his elbow to his hand. Throughout this presentation, patient has denied any fever or chills. Past Medical History:        Diagnosis Date    Diabetes mellitus [address] [address]     Hyperlipidemia     Thyroid disease      Past Surgical History:    No past surgical history on file.   Current Medications:   Current Facility-Administered Medications: sodium chloride flush 0.9 % injection 10 mL, 10 mL, Intravenous, 2 times per day  sodium chloride flush 0.9 % injection 10 mL, 10 mL, Intravenous, PRN  enoxaparin (LOVENOX) injection 40 mg, 40 mg, Subcutaneous, Daily  promethazine (PHENERGAN) tablet 12.5 mg, 12.5 mg, Oral, Q6H PRN **OR** ondansetron (ZOFRAN) injection 4 mg, 4 mg, Intravenous, Q6H PRN  polyethylene glycol (GLYCOLAX) packet 17 g, 17 g, Oral, Daily PRN  acetaminophen (TYLENOL) tablet 650 mg, 650 mg, Oral, Q6H PRN **OR** acetaminophen (TYLENOL) suppository 650 mg, 650 mg, Rectal, Q6H PRN  atorvastatin (LIPITOR) tablet 40 mg, 40 mg, Oral, Daily  levothyroxine (SYNTHROID) tablet 50 mcg, 50 mcg, Oral, Daily  insulin lispro (HUMALOG) injection vial 0-12 Units, 0-12 Units, Subcutaneous, TID WC  insulin lispro (HUMALOG) injection vial 0-6 Units, 0-6 Units, Subcutaneous, Nightly  oxyCODONE-acetaminophen (PERCOCET)  MG per tablet 1 tablet, 1 tablet, Oral, Q6H PRN  ampicillin-sulbactam (UNASYN) 3 g ivpb minibag, 3 g, Intravenous, Q8H  vancomycin 1.5 g in dextrose 5% 300 mL IVPB, 1,500 mg, Intravenous, Q8H  Allergies:  Patient has no known allergies. Social History:   TOBACCO:   reports that he has quit smoking. He has never used smokeless tobacco.  ETOH:   reports current alcohol use. DRUGS:   reports no history of drug use. ACTIVITIES OF DAILY LIVING:    OCCUPATION:    Family History:   No family history on file.     REVIEW OF SYSTEMS:  CONSTITUTIONAL:  negative for  fevers, chills  EYES:  negative for blurred vision, visual disturbance  HEENT:  negative for  hearing loss, voice change  RESPIRATORY:  negative for  dyspnea, wheezing  CARDIOVASCULAR:  negative for  chest pain, palpitations  GASTROINTESTINAL:  negative for nausea, vomiting  GENITOURINARY:  negative for frequency, urinary incontinence  HEMATOLOGIC/LYMPHATIC:  negative for bleeding and petechiae  MUSCULOSKELETAL:  positive for right forearm and hand pain and swelling NEUROLOGICAL:  negative for headaches, dizziness  BEHAVIOR/PSYCH:  negative for increased agitation and anxiety    PHYSICAL EXAM:    VITALS:  BP (!) 146/94   Pulse 93   Temp 97.4 °F (36.3 °C) (Temporal) significant pain and limited mobility in the right upper extremity. · Recommend continued IV antibiotics per infectious disease/medicine recommendations  · Cisco pillow to right upper extremity for strict elevation for edema control  · Compression wrap applied to the right upper extremity  · Pain control per primary  · We will continue to monitor patient for improvement on IV antibiotics. If patient's condition remains stagnant or worsens overnight, we will re-evaluate tomorrow morning and consider  formal I&D  · N.p.o. after midnight  · Plan discussed with Dr. Kaylen Carrillo      I have seen and evaluated the patient and agree with the above assessment on today's visit. I have performed the key components of the history and physical examination and concur completely with the findings and plans as documented. Agree with ROS, examination, FMH, PMH, PSH, SocHx, and allergies as above. Patient physically seen and examined. Patient with trauma to the hand with development of cellulitis sent here [facility]. Currently have most of his pain over his wrist and his right second PIP joint. It actually started as an injury to his thumb which that has no pain whatsoever. He does have some swelling to his hand. Agree with examination above except for the details added. Talk to the patient in detail explained that we would I&D the dorsum of the hand and the PIP joint and most likely the wrist joint where he has significant pain with any range of motion of either these. He is agreeable to plan. I did go over the fact that you could have recurrent infection or further infection need a washout in the future. Talk to him in detail about the fact you have wound healing issues. There is a risk of death from anesthesia, and other unforeseeable complications. He understands wishes to proceed with irrigation debridement of his right hand, wrist, and second PIP joint.       Physical Examination:   General appearance: alert, well appearing, and in no distress,  normal appearing weight. No visible signs of trauma   Mental status: alert, oriented to person, place, and time, normal mood, behavior, speech, dress, motor activity, and thought processes  Abdomen: soft, nondistended  Resp:   resp easy and unlabored, no audible wheezes note, normal symmetrical expansion of both hemithoraces  Cardiac: distal pulses palpable, skin and extremities well perfused  Neurological: alert, oriented X3, normal speech, no focal findings or movement disorder noted, motor and sensory grossly normal bilaterally, normal muscle tone, no tremors  HEENT: normochephalic atraumatic, external ears and eyes normal, sclera normal, neck supple, no nasal discharge. Extremities:   peripheral pulses normal, no edema, redness or tenderness in the calves   Skin: normal coloration, no rashes or open wounds, no suspicious skin lesions noted  Psych: Affect euthymic   Musculoskeletal:   Extremity:  Please see above H&P of mine with added physical exam details. Otherwise agree with examination above        ELECTRONICALLY signed by:    Uvaldo Husain MD  1/17/21   This is been dictated utilizing voice recognition software. All efforts have been made to make the note accurate although inadvertent errors may be present.

## 2021-01-16 NOTE — ANESTHESIA PRE PROCEDURE
Department of Anesthesiology  Preprocedure Note       Name:  Octaivo Feng   Age:  44 y.o.  :  1970                                          MRN:  [id number]         Date:  2021      Surgeon: Marcial Randhawa):  Joan Medina MD    Procedure: Procedure(s):  HAND INCISION AND DRAINAGE    Medications prior to admission:   Prior to Admission medications    Medication Sig Start Date End Date Taking?  Authorizing Provider   insulin regular (HUMULIN R;NOVOLIN R) 100 UNIT/ML injection Inject 20 Units into the skin 2 times daily (before meals)   Yes Historical Provider, MD   metFORMIN (GLUCOPHAGE) 850 MG tablet Take 850 mg by mouth 2 times daily (with meals)   Yes Historical Provider, MD   empagliflozin (JARDIANCE) 25 MG tablet Take 25 mg by mouth daily   Yes Historical Provider, MD   atorvastatin (LIPITOR) 40 MG tablet Take 40 mg by mouth daily   Yes Historical Provider, MD   levothyroxine (SYNTHROID) 50 MCG tablet Take 50 mcg by mouth Daily   Yes Historical Provider, MD   SUMAtriptan (IMITREX) 100 MG tablet Take 100 mg by mouth once as needed for Migraine   Yes Historical Provider, MD       Current medications:    Current Facility-Administered Medications   Medication Dose Route Frequency Provider Last Rate Last Admin    ampicillin-sulbactam (UNASYN) 3 g ivpb minibag  3 g Intravenous Q6H Julianna Tay MD   Stopped at 21 1433    DAPTOmycin (CUBICIN) 450 mg in sodium chloride 0.9 % 50 mL IVPB  6 mg/kg (Ideal) Intravenous Q24H Julianna Tay MD   Stopped at 21 1523    morphine (PF) injection 2 mg  2 mg Intravenous Q4H PRN Mona Botello DO   2 mg at 21 1536    sodium chloride flush 0.9 % injection 10 mL  10 mL Intravenous 2 times per day Glory Love MD   10 mL at 21 8579    sodium chloride flush 0.9 % injection 10 mL  10 mL Intravenous PRN Glory Love MD        enoxaparin (LOVENOX) injection 40 mg  40 mg Subcutaneous Daily Glory Love MD   40 mg at 21 1087  promethazine (PHENERGAN) tablet 12.5 mg  12.5 mg Oral Q6H PRN Malika Bradley MD        Or    ondansetron [facility]) injection 4 mg  4 mg Intravenous Q6H PRN Malika Bradley MD   4 mg at 01/16/21 1537    polyethylene glycol (GLYCOLAX) packet 17 g  17 g Oral Daily PRN Malika Bradley MD        acetaminophen (TYLENOL) tablet 650 mg  650 mg Oral Q6H PRN Malika Bradley MD        Or   [facility] acetaminophen (TYLENOL) suppository 650 mg  650 mg Rectal Q6H PRN Malika Bradley MD        atorvastatin (LIPITOR) tablet 40 mg  40 mg Oral Daily Malika Bradley MD   40 mg at 01/16/21 0899    levothyroxine (SYNTHROID) tablet 50 mcg  50 mcg Oral Daily Malika Bradley MD   50 mcg at 01/16/21 7746    insulin lispro (HUMALOG) injection vial 0-12 Units  0-12 Units Subcutaneous TID WC Malika Bradley MD   2 Units at 01/16/21 1243    insulin lispro (HUMALOG) injection vial 0-6 Units  0-6 Units Subcutaneous Nightly Malika Bradley MD        oxyCODONE-acetaminophen (PERCOCET)  MG per tablet 1 tablet  1 tablet Oral Q6H PRN Malika Bradley MD   1 tablet at 01/16/21 1240       Allergies:  No Known Allergies    Problem List:    Patient Active Problem List   Diagnosis Code    Cellulitis L03.90       Past Medical History:        Diagnosis Date    Diabetes mellitus ([facility] [address]     Hyperlipidemia     Thyroid disease        Past Surgical History:  No past surgical history on file.     Social History:    Social History     Tobacco Use    Smoking status: Former Smoker    Smokeless tobacco: Never Used   Substance Use Topics    Alcohol use: Yes     Comment: social                                Counseling given: Not Answered      Vital Signs (Current):   Vitals:    01/15/21 1838 01/15/21 2030 01/16/21 0840   BP: (!) 142/93 (!) 160/98 (!) 146/94   Pulse: 97 100 93   Resp: 16 16 16   Temp: 36.8 °C (98.2 °F) 36.7 °C (98 °F) 36.3 °C (97.4 °F)   TempSrc: Tympanic Temporal Temporal   SpO2: 95% 95% 96% BP Readings from Last 3 Encounters:   01/16/21 (!) 146/94   01/15/21 (!) 164/89       NPO Status:   Patient instructed to remain Npo after midnight for procedure tomorrow morning                                                                               BMI:   Wt Readings from Last 3 Encounters:   01/14/21 230 lb (104.3 kg)     There is no height or weight on file to calculate BMI.    CBC:   Lab Results   Component Value Date    WBC 11.7 01/16/2021    RBC 5.02 01/16/2021    HGB 13.9 01/16/2021    HCT 42.3 01/16/2021    MCV 84.3 01/16/2021    RDW 14.2 01/16/2021     01/16/2021       CMP:   Lab Results   Component Value Date     01/14/2021    K 4.1 01/14/2021     01/14/2021    CO2 23 01/14/2021    BUN 24 01/14/2021    CREATININE 0.8 01/14/2021    GFRAA >60 01/14/2021    LABGLOM >60 01/14/2021    GLUCOSE 110 01/15/2021    PROT 8.6 01/14/2021    CALCIUM 10.1 01/14/2021    BILITOT 0.4 01/14/2021    ALKPHOS 136 01/14/2021    AST 14 01/14/2021    ALT 18 01/14/2021       POC Tests: No results for input(s): POCGLU, POCNA, POCK, POCCL, POCBUN, POCHEMO, POCHCT in the last 72 hours.     Coags: No results found for: PROTIME, INR, APTT    HCG (If Applicable): No results found for: PREGTESTUR, PREGSERUM, HCG, HCGQUANT     ABGs: No results found for: PHART, PO2ART, WSW6AGZ, GIV3JHJ, BEART, H7GACHGS     Type & Screen (If Applicable):  No results found for: LABABO, LABRH    Drug/Infectious Status (If Applicable):  No results found for: HIV, HEPCAB    COVID-19 Screening (If Applicable): No results found for: COVID19      Anesthesia Evaluation  Patient summary reviewed and Nursing notes reviewed no history of anesthetic complications:   Airway: Mallampati: II  TM distance: >3 FB   Neck ROM: full  Mouth opening: > = 3 FB Dental: normal exam         Pulmonary:Negative Pulmonary ROS and normal exam  breath sounds clear to auscultation                             Cardiovascular:  Exercise tolerance: good (>4 METS), (+) hyperlipidemia      NYHA Classification: I    Rhythm: regular  Rate: normal           Beta Blocker:  Not on Beta Blocker         Neuro/Psych:   Negative Neuro/Psych ROS              GI/Hepatic/Renal: Neg GI/Hepatic/Renal ROS            Endo/Other:    (+) DiabetesType I DM, well controlled, , hypothyroidism::., .                 Abdominal:   (+) obese,         Vascular: negative vascular ROS. Anesthesia Plan      MAC     ASA 2     (20 gauge left AC)  Induction: intravenous. MIPS: Prophylactic antiemetics administered. Anesthetic plan and risks discussed with patient. Use of blood products discussed with patient whom consented to blood products. Plan discussed with CRNA. Josafat Jackson RN   1/16/2021    DOS STAFF ADDENDUM:    Pt seen and examined, chart reviewed (including anesthesia, drug and allergy history). Anesthetic plan, risks, benefits, alternatives, and personnel involved discussed with patient. Patient verbalized an understanding and agrees to proceed. Plan discussed with care team members and agreed upon.     Olesya Meneses MD  Staff Anesthesiologist  10:19 AM

## 2021-01-16 NOTE — PROGRESS NOTES
Pharmacy Consultation Note  (Antibiotic Dosing and Monitoring)    Initial consult date: 1/15/21  Consulting physician: Dr. Luther Larson  Drug: Vancomycin  Indication: Cellulitis of right hand    Age/  Gender Height Weight IBW Dosing weight  Allergy Information   50 y.o./male       6' (182.9 cm)     230 lb (104.3 kg)   Ideal body weight: 77.6 kg (171 lb 1.2 oz)  Adjusted ideal body weight: 88.3 kg (194 lb 10.3 oz)  88.3 kg  Patient has no known allergies. Temp (24hrs), Av.9 °F (36.6 °C), Min:97.4 °F (36.3 °C), Max:98.2 °F (36.8 °C)          Date  WBC BUN/SCr Drug/Dose Time   Given Level(s)   (Time) Comments   1/15 X X Vancomycin 1750 mg IV x1  2336      X X Vancomycin 1500 mg IV x1  -----------------  Vancomycin 1750 mg IV Q12H 0844                           No intake or output data in the 24 hours ending 21 1006    Historical Cultures:  No results found for: [facility]  Recent Labs     21  1637   BC 24 Hours no growth       Cultures:  available culture and sensitivity results were reviewed in EPIC    Assessment:  · Patient is a 44year old male ordered vancomycin and ampicillin/sulbactam  · Goal trough level = 15-20 mcg/mL; goal AUC/ROSE = 400-600    Plan:  · Will continue vancomycin 1750 mg IV every 12 hours  · Monitor renal function   · Clinical pharmacy to follow    Byron Arora PharmD, BCCCP  2021  11:03 AM    Addendum:  Note vancomycin discontinued. Clinical pharmacy will sign-off. Please re-consult if we can be of further assistance.     Byron Arora PharmD, BCCCP  2021  11:54 AM

## 2021-01-16 NOTE — PROGRESS NOTES
Pharmacy Consultation Note  (Antibiotic Dosing and Monitoring)    Initial consult date: 1/15/21  Consulting physician: Dr. Aldo Saldaña  Drug: Vancomycin  Indication: Cellulitis of right hand    Age/  Gender Height Weight IBW Dosing weight  Allergy Information   50 y.o./male       6' (182.9 cm)     230 lb (104.3 kg)   Ideal body weight: 77.6 kg (171 lb 1.2 oz)  Adjusted ideal body weight: 88.3 kg (194 lb 10.3 oz)  88.3 kg  Patient has no known allergies. Temp (24hrs), Av.2 °F (36.8 °C), Min:98 °F (36.7 °C), Max:98.3 °F (36.8 °C)          Date  WBC BUN SCr CrCl  (mL/min) Drug/Dose Time   Given Level(s)   (Time) Comments   1/15        Vancomycin 1750 mg IV x1  <2324>          Vancomycin 1500 mg IV q8h <0800>                               No intake or output data in the 24 hours ending 01/15/21 2204    Historical Cultures:  No results found for: [facility]  Recent Labs     21  1637   BC 24 Hours no growth       Cultures:  available culture and sensitivity results were reviewed in EPIC    Assessment:  · 44 y.o. male has been initiated Vancomycin.   · Estimated CrCl = 135 mL/min  · Goal trough level = 15-20 mcg/mL    Plan:  · Will initiate vancomycin at a dose of 1500 mg IV q8h  · Monitor renal function   · Clinical pharmacy to follow      TERESA Chavez [facility] 1/15/2021 10:04 PM

## 2021-01-16 NOTE — PROGRESS NOTES
HOSPITALIST PROGRESS NOTE  Date: 1/16/2021   Name: Derek Baum   MRN: [id number]   YOB: 1970        Hospital Course:   44 y.o. old male presenting to the emergency department by private vehicle, for traumatic Right hand and wrist pain which occured 7 day(s) prior to arrival.  The complaint is due to injury of the right thumb and wrist one week ago. Patient reports he felt it was a minor injury when he stubbed his thumb at work. 2 days later his index finger and middle finger were significantly swollen so he followed up at a local hospital where x-rays were negative. There was concern for gout so they started him on indomethacin. He followed up with his primary care doctor when it continued to worsen who started him on Keflex and Bactrim; eventually transferred to [facility] for further evaluaton and ortho consultation. Subjective/Interval Hx:   Patient complains of pain and is requesting breakthrough pain. Presently is on oxycodone is injectable. He reported to surgery so they are aware of his pain threshold at this time.     Objective:   Physical Exam:   BP (!) 146/94   Pulse 93   Temp 97.4 °F (36.3 °C) (Temporal)   Resp 16   SpO2 96%   General: no acute distress, well nourished and well hydrated  HEENT: NCAT  Heart: S1S2 RRR  Lungs: Clear to ascultation bilaterally, respiratory effort normal  Abdomen: soft, NT/ND, positive bowel sounds  Extremities: no pitting edema, nontender   Neuro: patient is awake, alert and orientated times 3, no gross deficits  Skin: no rashes or ecchymosis        Meds:   Meds:    ampicillin-sulbactam  3 g Intravenous Q6H    daptomycin (CUBICIN) IVPB  6 mg/kg (Ideal) Intravenous Q24H    sodium chloride flush  10 mL Intravenous 2 times per day    enoxaparin  40 mg Subcutaneous Daily    atorvastatin  40 mg Oral Daily    levothyroxine  50 mcg Oral Daily    insulin lispro  0-12 Units Subcutaneous TID WC    insulin lispro  0-6 Units Subcutaneous Nightly Infusions:   PRN Meds:     morphine, 2 mg, Q4H PRN      sodium chloride flush, 10 mL, PRN      promethazine, 12.5 mg, Q6H PRN    Or      ondansetron, 4 mg, Q6H PRN      polyethylene glycol, 17 g, Daily PRN      acetaminophen, 650 mg, Q6H PRN    Or      acetaminophen, 650 mg, Q6H PRN      oxyCODONE-acetaminophen, 1 tablet, Q6H PRN        Data/Labs:     Recent Labs     01/14/21  1637 01/16/21  1238   WBC 13.4* 11.7*   HGB 14.7 13.9   HCT 43.4 42.3    289      Recent Labs     01/14/21  1637      K 4.1      CO2 23   BUN 24*   CREATININE 0.8     Recent Labs     01/14/21  1637   AST 14   ALT 18   BILITOT 0.4   ALKPHOS 136*     No results for input(s): INR in the last 72 hours. No results for input(s): CKTOTAL, CKMB, CKMBINDEX, TROPONINT in the last 72 hours. No intake/output data recorded. No intake or output data in the 24 hours ending 01/16/21 1453     Assessment/Plan:   1. Acute right hand infection-orthopedic surgery has been consulted patient has been having soft tissue edema in the right hand but no abscess formation we will continue IV antibiotics with ID following for recommendations  2. Non-insulin-dependent diabetes mellitus-sliding scale, diabetic diet, monitor blood glucose  3. Hypothyroidism-Synthroid daily  4.  Hyperlipidemia-      DVT Prophylaxis: Lovenox  Diet: DIET GENERAL;  Diet NPO, After Midnight  Code Status: Full Code    Dispo when stable     Electronically signed by Shilpa Phelps MD on 1/16/2021 at 2:53 PM  [facility]

## 2021-01-16 NOTE — H&P
INTERNAL MEDICINE HISTORY AND PHYSICAL EXAM     CHIEF COMPLAINT:   No chief complaint on file. HISTORY OF PRESENTING ILLNESS:   44 y.o. old male presenting to the emergency department by private vehicle, for traumatic Right hand and wrist pain which occured 7 day(s) prior to arrival.  The complaint is due to injury of the right thumb and wrist one week ago. Patient reports he felt it was a minor injury when he stubbed his thumb at work. 2 days later his index finger and middle finger were significantly swollen so he followed up at a local hospital where x-rays were negative. There was concern for gout so they started him on indomethacin. He followed up with his primary care doctor when it continued to worsen who started him on Keflex and Bactrim. The next day, today, he followed up with orthopedic surgeon who advised him to come directly to this hospital concern for deep tissue infection. He is right handed. Patient has no prior history of pain/injury with regards to today's visit. Patient's compartments were compressible he had palpable pulses and was neurovascular intact. CT imaging was concerning for cellulitis he was started here with IV antibiotics. Patient was then transferred to [facility] for further better treatment. PAST MEDICAL HISTORY  Past Medical History:   Diagnosis Date    Diabetes mellitus ([facility] [address]     Hyperlipidemia     Thyroid disease        PAST SURGICAL HISTORY  No past surgical history on file. FAMILY HISTORY  No family history on file. SOCIAL HISTORY   reports that he has quit smoking. He has never used smokeless tobacco. He reports current alcohol use. He reports that he does not use drugs.       MEDICATIONS   Medications Prior to Admission: insulin regular (HUMULIN R;NOVOLIN R) 100 UNIT/ML injection, Inject 20 Units into the skin 2 times daily (before meals)  metFORMIN (GLUCOPHAGE) 850 MG tablet, Take 850 mg by mouth 2 times daily (with meals)  empagliflozin (JARDIANCE) 25 MG tablet, Take 25 mg by mouth daily  atorvastatin (LIPITOR) 40 MG tablet, Take 40 mg by mouth daily  levothyroxine (SYNTHROID) 50 MCG tablet, Take 50 mcg by mouth Daily  SUMAtriptan (IMITREX) 100 MG tablet, Take 100 mg by mouth once as needed for Migraine  Current Facility-Administered Medications   Medication Dose Route Frequency Provider Last Rate Last Admin    HYDROmorphone (DILAUDID) injection 1 mg  1 mg Intravenous Once Sallie Maloney MD        sodium chloride flush 0.9 % injection 10 mL  10 mL Intravenous 2 times per day Sallie Maloney MD   10 mL at 01/15/21 2043    sodium chloride flush 0.9 % injection 10 mL  10 mL Intravenous PRN Sallie Maloney MD        enoxaparin (LOVENOX) injection 40 mg  40 mg Subcutaneous Daily Sallie Maloney MD   40 mg at 01/15/21 2043    promethazine (PHENERGAN) tablet 12.5 mg  12.5 mg Oral Q6H PRN Sallie Maloney MD        Or    ondansetron (ZOFRAN) injection 4 mg  4 mg Intravenous Q6H PRN Sallie Maloney MD        polyethylene glycol (GLYCOLAX) packet 17 g  17 g Oral Daily PRN Sallie Maloney MD        acetaminophen (TYLENOL) tablet 650 mg  650 mg Oral Q6H PRN Sallie Maloney MD        Or   [facility] acetaminophen (TYLENOL) suppository 650 mg  650 mg Rectal Q6H PRN Sallie Maloney MD        atorvastatin (LIPITOR) tablet 40 mg  40 mg Oral Daily Sallie Maloney MD        levothyroxine (SYNTHROID) tablet 50 mcg  50 mcg Oral Daily Sallie Maloney MD        insulin lispro (HUMALOG) injection vial 0-12 Units  0-12 Units Subcutaneous TID WC Sallie Maloney MD        insulin lispro (HUMALOG) injection vial 0-6 Units  0-6 Units Subcutaneous Nightly Sallie Maloney MD        oxyCODONE-acetaminophen (PERCOCET)  MG per tablet 1 tablet  1 tablet Oral Q6H PRN Sallie Maloney MD   1 tablet at 01/15/21 2203    ampicillin-sulbactam (UNASYN) 3 g ivpb minibag  3 g Intravenous Q8H Sallie Maloney MD   Stopped at 01/15/21 8616    vancomycin 1.5 g in dextrose 5% 300 mL IVPB  1,500 mg Intravenous Shamir Napoles MD         Prior to Admission medications    Medication Sig Start Date End Date Taking? Authorizing Provider   insulin regular (HUMULIN R;NOVOLIN R) 100 UNIT/ML injection Inject 20 Units into the skin 2 times daily (before meals)   Yes Historical Provider, MD   metFORMIN (GLUCOPHAGE) 850 MG tablet Take 850 mg by mouth 2 times daily (with meals)   Yes Historical Provider, MD   empagliflozin (JARDIANCE) 25 MG tablet Take 25 mg by mouth daily   Yes Historical Provider, MD   atorvastatin (LIPITOR) 40 MG tablet Take 40 mg by mouth daily   Yes Historical Provider, MD   levothyroxine (SYNTHROID) 50 MCG tablet Take 50 mcg by mouth Daily   Yes Historical Provider, MD   SUMAtriptan (IMITREX) 100 MG tablet Take 100 mg by mouth once as needed for Migraine   Yes Historical Provider, MD       ALLERGIES   Patient has no known allergies. REVIEW OF SYSTEMS:  12 point review of system was done in detail with the patient and is negative except as above in HPI.     PHYSICAL EXAM:  VS: BP (!) 160/98   Pulse 100   Temp 98 °F (36.7 °C) (Temporal)   Resp 16   SpO2 95%   General Appearance: alert and oriented to person, place and time, in no acute distress  HEENT: normocephalic and atraumatic, pupils equal, round, and reactive to light, Ssupple and non-tender without mass, no thyromegaly   Cardiovascular: normal rate, regular rhythm, normal S1 and S2, no murmurs, rubs, clicks, or gallops, distal pulses intact, no carotid bruits, no JVD  Pulmonary/Chest: clear to auscultation bilaterally- no wheezes, rales or rhonchi, normal air movement, no respiratory distress  Abdomen: soft, non-tender, non-distended, normal bowel sounds, no masses   Extremities: no cyanosis, clubbing or edema, pulse   Musculoskeletal: Right upper extremity wrapped in compression dressing, neurovascular check negative for any significant finding, extremities warm and distal sensation is intact. Neurological: alert, oriented, normal speech, no focal findings or movement disorder noted    LABS:  Recent Results (from the past 24 hour(s))   POCT Glucose    Collection Time: 01/15/21 10:22 AM   Result Value Ref Range    Meter Glucose 110 (H) 74 - 99 mg/dL   POCT glucose    Collection Time: 01/15/21 10:27 AM   Result Value Ref Range    Glucose 110 mg/dL    QC OK? yes        RADIOLOGY:  Ct Wrist Right W Wo Contrast    Result Date: 1/14/2021  EXAMINATION: CT OF THE RIGHT WRIST WITH AND WITHOUT CONTRAST 1/14/2021 5:56 pm TECHNIQUE: CT of the right wrist was performed with and without the administration of intravenous contrast.  Multiplanar reformatted images are provided for review. Dose modulation, iterative reconstruction, and/or weight based adjustment of the mA/kV was utilized to reduce the radiation dose to as low as reasonably achievable. COMPARISON: None. HISTORY ORDERING SYSTEM PROVIDED HISTORY: minor injury 1 week ago at thumb, prior xrays normal, swelling and redness severe. pain over dorsal radial wrist, ro scaphoid fracrture, cellultis/abscess TECHNOLOGIST PROVIDED HISTORY: Reason for exam:->minor injury 1 week ago at thumb, prior xrays normal, swelling and redness severe. pain over dorsal radial wrist, ro scaphoid fracrture, cellultis/abscess FINDINGS: Bones: No fracture or dislocation. No suspicious lytic or blastic osseous lesion. Soft Tissue:  Dorsal subcutaneous edema. The visualized tendons are grossly intact. No soft tissue gas or foreign body identified. Joint:  Mild 1st metacarpophalangeal degenerative changes. The joint spaces are otherwise preserved. No osseous erosion. 1. No acute osseous abnormality. 2. Dorsal subcutaneous edema compatible with reactive edema or cellulitis. ASSESSMENT AND PLAN  Active Problems:    Cellulitis  Resolved Problems:    * No resolved hospital problems. *    Impression  1.  Right upper extremity cellulitis with erythema and swelling of hand and forearm. CT scan showed subcutaneous edema compatible with reactive edema or cellulitis, no abscess  2. Diabetes mellitus  3. Hypothyroidism  4. Hypertension  5. Hyperlipidemia  6. History of migraine  7. Patient is full code    Plan  · Continue IV antibiotic, currently on vancomycin and Unasyn  · We will consult infectious disease  · We will check hemoglobin A1c, will continue insulin sliding scale  · If needed we will add insulin  · Resume home medication chronic stable medical condition  · Incoming hospitalist to follow-up      DVT prophylaxis: Subcutaneous heparin  CODE STATUS: Patient is a full code  Discharge plan: Patient can be discharged next 3 to 4 days to home with and without home health.,  Pending clinical improvement, pending work-up and clearance by consulting services. Please note that over 35 minutes was spent in evaluating the patient, review of records and results, discussion with staff/family, etc.    Yao Gray MD  [facility]   [phone]    NOTE: This report was transcribed using voice recognition software. Every effort was made to ensure accuracy; however, inadvertent computerized transcription errors may be present.

## 2021-01-16 NOTE — PROGRESS NOTES
Pharmacy was consulted to dose/monitor vancomycin which has now been discontinued. Clinical Pharmacy will sign off at this time.     ID switched to Daptomycin    Jessica South, PharmD 1/16/2021 3:15 PM  [phone]

## 2021-01-17 ENCOUNTER — ANESTHESIA (OUTPATIENT)
Dept: OPERATING ROOM | Age: 51
DRG: 506 | End: 2021-01-17
Payer: COMMERCIAL

## 2021-01-17 VITALS — OXYGEN SATURATION: 90 % | DIASTOLIC BLOOD PRESSURE: 94 MMHG | SYSTOLIC BLOOD PRESSURE: 151 MMHG

## 2021-01-17 LAB
METER GLUCOSE: 131 MG/DL (ref 74–99)
METER GLUCOSE: 148 MG/DL (ref 74–99)
METER GLUCOSE: 199 MG/DL (ref 74–99)
METER GLUCOSE: 272 MG/DL (ref 74–99)
REASON FOR REJECTION: NORMAL
REASON FOR REJECTION: NORMAL
REJECTED TEST: NORMAL
REJECTED TEST: NORMAL

## 2021-01-17 PROCEDURE — 2500000003 HC RX 250 WO HCPCS

## 2021-01-17 PROCEDURE — 26080 EXPLORE/TREAT FINGER JOINT: CPT | Performed by: ORTHOPAEDIC SURGERY

## 2021-01-17 PROCEDURE — 7100000000 HC PACU RECOVERY - FIRST 15 MIN: Performed by: ORTHOPAEDIC SURGERY

## 2021-01-17 PROCEDURE — 6360000002 HC RX W HCPCS: Performed by: SPECIALIST

## 2021-01-17 PROCEDURE — 05HY33Z INSERTION OF INFUSION DEVICE INTO UPPER VEIN, PERCUTANEOUS APPROACH: ICD-10-PCS | Performed by: INTERNAL MEDICINE

## 2021-01-17 PROCEDURE — 3600000002 HC SURGERY LEVEL 2 BASE: Performed by: ORTHOPAEDIC SURGERY

## 2021-01-17 PROCEDURE — 2580000003 HC RX 258: Performed by: SPECIALIST

## 2021-01-17 PROCEDURE — 25040 ARTHRT RDCRPL/MIDCARPL JT: CPT | Performed by: ORTHOPAEDIC SURGERY

## 2021-01-17 PROCEDURE — 6360000002 HC RX W HCPCS: Performed by: STUDENT IN AN ORGANIZED HEALTH CARE EDUCATION/TRAINING PROGRAM

## 2021-01-17 PROCEDURE — 3700000001 HC ADD 15 MINUTES (ANESTHESIA): Performed by: ORTHOPAEDIC SURGERY

## 2021-01-17 PROCEDURE — 3700000000 HC ANESTHESIA ATTENDED CARE: Performed by: ORTHOPAEDIC SURGERY

## 2021-01-17 PROCEDURE — 2580000003 HC RX 258: Performed by: STUDENT IN AN ORGANIZED HEALTH CARE EDUCATION/TRAINING PROGRAM

## 2021-01-17 PROCEDURE — 6360000002 HC RX W HCPCS: Performed by: ORTHOPAEDIC SURGERY

## 2021-01-17 PROCEDURE — 6360000002 HC RX W HCPCS: Performed by: REGISTERED NURSE

## 2021-01-17 PROCEDURE — 82962 GLUCOSE BLOOD TEST: CPT

## 2021-01-17 PROCEDURE — 1200000000 HC SEMI PRIVATE

## 2021-01-17 PROCEDURE — 7100000001 HC PACU RECOVERY - ADDTL 15 MIN: Performed by: ORTHOPAEDIC SURGERY

## 2021-01-17 PROCEDURE — 87186 SC STD MICRODIL/AGAR DIL: CPT

## 2021-01-17 PROCEDURE — 87075 CULTR BACTERIA EXCEPT BLOOD: CPT

## 2021-01-17 PROCEDURE — 87205 SMEAR GRAM STAIN: CPT

## 2021-01-17 PROCEDURE — 6370000000 HC RX 637 (ALT 250 FOR IP): Performed by: STUDENT IN AN ORGANIZED HEALTH CARE EDUCATION/TRAINING PROGRAM

## 2021-01-17 PROCEDURE — C1751 CATH, INF, PER/CENT/MIDLINE: HCPCS

## 2021-01-17 PROCEDURE — 6370000000 HC RX 637 (ALT 250 FOR IP): Performed by: EMERGENCY MEDICINE

## 2021-01-17 PROCEDURE — 26075 EXPLORE/TREAT FINGER JOINT: CPT | Performed by: ORTHOPAEDIC SURGERY

## 2021-01-17 PROCEDURE — 2709999900 HC NON-CHARGEABLE SUPPLY: Performed by: ORTHOPAEDIC SURGERY

## 2021-01-17 PROCEDURE — 2580000003 HC RX 258

## 2021-01-17 PROCEDURE — 87070 CULTURE OTHR SPECIMN AEROBIC: CPT

## 2021-01-17 PROCEDURE — 76937 US GUIDE VASCULAR ACCESS: CPT

## 2021-01-17 PROCEDURE — 6360000002 HC RX W HCPCS

## 2021-01-17 PROCEDURE — 3600000012 HC SURGERY LEVEL 2 ADDTL 15MIN: Performed by: ORTHOPAEDIC SURGERY

## 2021-01-17 PROCEDURE — 36569 INSJ PICC 5 YR+ W/O IMAGING: CPT

## 2021-01-17 PROCEDURE — 0R9N0ZZ DRAINAGE OF RIGHT WRIST JOINT, OPEN APPROACH: ICD-10-PCS | Performed by: ORTHOPAEDIC SURGERY

## 2021-01-17 PROCEDURE — 2580000003 HC RX 258: Performed by: EMERGENCY MEDICINE

## 2021-01-17 PROCEDURE — 87147 CULTURE TYPE IMMUNOLOGIC: CPT

## 2021-01-17 RX ORDER — OXYCODONE HYDROCHLORIDE 5 MG/1
5 TABLET ORAL EVERY 6 HOURS PRN
Qty: 20 TABLET | Refills: 0 | Status: SHIPPED | OUTPATIENT
Start: 2021-01-17 | End: 2021-01-22

## 2021-01-17 RX ORDER — SODIUM CHLORIDE 0.9 % (FLUSH) 0.9 %
10 SYRINGE (ML) INJECTION PRN
Status: DISCONTINUED | OUTPATIENT
Start: 2021-01-17 | End: 2021-01-19 | Stop reason: HOSPADM

## 2021-01-17 RX ORDER — LABETALOL HYDROCHLORIDE 5 MG/ML
INJECTION, SOLUTION INTRAVENOUS PRN
Status: DISCONTINUED | OUTPATIENT
Start: 2021-01-17 | End: 2021-01-17 | Stop reason: SDUPTHER

## 2021-01-17 RX ORDER — ONDANSETRON 2 MG/ML
4 INJECTION INTRAMUSCULAR; INTRAVENOUS
Status: DISCONTINUED | OUTPATIENT
Start: 2021-01-17 | End: 2021-01-17 | Stop reason: HOSPADM

## 2021-01-17 RX ORDER — PROMETHAZINE HYDROCHLORIDE 25 MG/ML
6.25 INJECTION, SOLUTION INTRAMUSCULAR; INTRAVENOUS
Status: DISCONTINUED | OUTPATIENT
Start: 2021-01-17 | End: 2021-01-17 | Stop reason: HOSPADM

## 2021-01-17 RX ORDER — LIDOCAINE HYDROCHLORIDE 10 MG/ML
5 INJECTION, SOLUTION EPIDURAL; INFILTRATION; INTRACAUDAL; PERINEURAL ONCE
Status: DISCONTINUED | OUTPATIENT
Start: 2021-01-17 | End: 2021-01-19 | Stop reason: HOSPADM

## 2021-01-17 RX ORDER — FENTANYL CITRATE 50 UG/ML
INJECTION, SOLUTION INTRAMUSCULAR; INTRAVENOUS PRN
Status: DISCONTINUED | OUTPATIENT
Start: 2021-01-17 | End: 2021-01-17 | Stop reason: SDUPTHER

## 2021-01-17 RX ORDER — CEFAZOLIN SODIUM 1 G/3ML
INJECTION, POWDER, FOR SOLUTION INTRAMUSCULAR; INTRAVENOUS PRN
Status: DISCONTINUED | OUTPATIENT
Start: 2021-01-17 | End: 2021-01-17 | Stop reason: SDUPTHER

## 2021-01-17 RX ORDER — MIDAZOLAM HYDROCHLORIDE 1 MG/ML
INJECTION INTRAMUSCULAR; INTRAVENOUS PRN
Status: DISCONTINUED | OUTPATIENT
Start: 2021-01-17 | End: 2021-01-17 | Stop reason: SDUPTHER

## 2021-01-17 RX ORDER — HEPARIN SODIUM (PORCINE) LOCK FLUSH IV SOLN 100 UNIT/ML 100 UNIT/ML
3 SOLUTION INTRAVENOUS PRN
Status: DISCONTINUED | OUTPATIENT
Start: 2021-01-17 | End: 2021-01-19 | Stop reason: HOSPADM

## 2021-01-17 RX ORDER — OXYCODONE HYDROCHLORIDE 10 MG/1
10 TABLET ORAL EVERY 4 HOURS PRN
Status: DISCONTINUED | OUTPATIENT
Start: 2021-01-17 | End: 2021-01-19 | Stop reason: HOSPADM

## 2021-01-17 RX ORDER — OXYCODONE HYDROCHLORIDE 5 MG/1
5 TABLET ORAL EVERY 4 HOURS PRN
Status: DISCONTINUED | OUTPATIENT
Start: 2021-01-17 | End: 2021-01-19 | Stop reason: HOSPADM

## 2021-01-17 RX ORDER — PROPOFOL 10 MG/ML
INJECTION, EMULSION INTRAVENOUS PRN
Status: DISCONTINUED | OUTPATIENT
Start: 2021-01-17 | End: 2021-01-17 | Stop reason: SDUPTHER

## 2021-01-17 RX ORDER — ROCURONIUM BROMIDE 10 MG/ML
INJECTION, SOLUTION INTRAVENOUS PRN
Status: DISCONTINUED | OUTPATIENT
Start: 2021-01-17 | End: 2021-01-17 | Stop reason: SDUPTHER

## 2021-01-17 RX ORDER — GLYCOPYRROLATE 1 MG/5 ML
SYRINGE (ML) INTRAVENOUS PRN
Status: DISCONTINUED | OUTPATIENT
Start: 2021-01-17 | End: 2021-01-17 | Stop reason: SDUPTHER

## 2021-01-17 RX ORDER — HYDROMORPHONE HCL 110MG/55ML
PATIENT CONTROLLED ANALGESIA SYRINGE INTRAVENOUS PRN
Status: DISCONTINUED | OUTPATIENT
Start: 2021-01-17 | End: 2021-01-17 | Stop reason: SDUPTHER

## 2021-01-17 RX ORDER — SODIUM CHLORIDE 0.9 % (FLUSH) 0.9 %
10 SYRINGE (ML) INJECTION EVERY 12 HOURS SCHEDULED
Status: DISCONTINUED | OUTPATIENT
Start: 2021-01-17 | End: 2021-01-19 | Stop reason: HOSPADM

## 2021-01-17 RX ORDER — LIDOCAINE HYDROCHLORIDE 20 MG/ML
INJECTION, SOLUTION INTRAVENOUS PRN
Status: DISCONTINUED | OUTPATIENT
Start: 2021-01-17 | End: 2021-01-17 | Stop reason: SDUPTHER

## 2021-01-17 RX ORDER — ONDANSETRON 2 MG/ML
INJECTION INTRAMUSCULAR; INTRAVENOUS PRN
Status: DISCONTINUED | OUTPATIENT
Start: 2021-01-17 | End: 2021-01-17 | Stop reason: SDUPTHER

## 2021-01-17 RX ORDER — NEOSTIGMINE METHYLSULFATE 1 MG/ML
INJECTION, SOLUTION INTRAVENOUS PRN
Status: DISCONTINUED | OUTPATIENT
Start: 2021-01-17 | End: 2021-01-17 | Stop reason: SDUPTHER

## 2021-01-17 RX ORDER — HEPARIN SODIUM (PORCINE) LOCK FLUSH IV SOLN 100 UNIT/ML 100 UNIT/ML
3 SOLUTION INTRAVENOUS EVERY 12 HOURS SCHEDULED
Status: DISCONTINUED | OUTPATIENT
Start: 2021-01-17 | End: 2021-01-19 | Stop reason: HOSPADM

## 2021-01-17 RX ORDER — SODIUM CHLORIDE 9 MG/ML
INJECTION, SOLUTION INTRAVENOUS CONTINUOUS PRN
Status: DISCONTINUED | OUTPATIENT
Start: 2021-01-17 | End: 2021-01-17 | Stop reason: SDUPTHER

## 2021-01-17 RX ADMIN — FENTANYL CITRATE 50 MCG: 50 INJECTION, SOLUTION INTRAMUSCULAR; INTRAVENOUS at 10:25

## 2021-01-17 RX ADMIN — SODIUM CHLORIDE: 9 INJECTION, SOLUTION INTRAVENOUS at 10:04

## 2021-01-17 RX ADMIN — Medication 3 MG: at 10:37

## 2021-01-17 RX ADMIN — MORPHINE SULFATE 2 MG: 2 INJECTION, SOLUTION INTRAMUSCULAR; INTRAVENOUS at 06:03

## 2021-01-17 RX ADMIN — SODIUM CHLORIDE 3 G: 900 INJECTION INTRAVENOUS at 13:00

## 2021-01-17 RX ADMIN — LABETALOL HYDROCHLORIDE 2.5 MG: 5 INJECTION INTRAVENOUS at 10:29

## 2021-01-17 RX ADMIN — Medication 10 ML: at 09:38

## 2021-01-17 RX ADMIN — ROCURONIUM BROMIDE 50 MG: 10 INJECTION, SOLUTION INTRAVENOUS at 10:04

## 2021-01-17 RX ADMIN — MORPHINE SULFATE 2 MG: 2 INJECTION, SOLUTION INTRAMUSCULAR; INTRAVENOUS at 01:42

## 2021-01-17 RX ADMIN — HYDROMORPHONE HYDROCHLORIDE 0.4 MG: 2 INJECTION, SOLUTION INTRAMUSCULAR; INTRAVENOUS; SUBCUTANEOUS at 10:50

## 2021-01-17 RX ADMIN — OXYCODONE HYDROCHLORIDE 10 MG: 10 TABLET ORAL at 21:24

## 2021-01-17 RX ADMIN — LEVOTHYROXINE SODIUM 50 MCG: 0.05 TABLET ORAL at 06:02

## 2021-01-17 RX ADMIN — Medication 0.6 MG: at 10:37

## 2021-01-17 RX ADMIN — LABETALOL HYDROCHLORIDE 2.5 MG: 5 INJECTION INTRAVENOUS at 10:41

## 2021-01-17 RX ADMIN — SODIUM CHLORIDE 3 G: 900 INJECTION INTRAVENOUS at 18:03

## 2021-01-17 RX ADMIN — MORPHINE SULFATE 2 MG: 2 INJECTION, SOLUTION INTRAMUSCULAR; INTRAVENOUS at 13:00

## 2021-01-17 RX ADMIN — FENTANYL CITRATE 100 MCG: 50 INJECTION, SOLUTION INTRAMUSCULAR; INTRAVENOUS at 10:04

## 2021-01-17 RX ADMIN — FENTANYL CITRATE 50 MCG: 50 INJECTION, SOLUTION INTRAMUSCULAR; INTRAVENOUS at 10:44

## 2021-01-17 RX ADMIN — OXYCODONE HYDROCHLORIDE AND ACETAMINOPHEN 1 TABLET: 10; 325 TABLET ORAL at 16:31

## 2021-01-17 RX ADMIN — CEFAZOLIN 2000 MG: 1 INJECTION, POWDER, FOR SOLUTION INTRAMUSCULAR; INTRAVENOUS at 10:21

## 2021-01-17 RX ADMIN — ONDANSETRON HYDROCHLORIDE 4 MG: 2 INJECTION, SOLUTION INTRAMUSCULAR; INTRAVENOUS at 10:37

## 2021-01-17 RX ADMIN — HYDROMORPHONE HYDROCHLORIDE 0.4 MG: 2 INJECTION, SOLUTION INTRAMUSCULAR; INTRAVENOUS; SUBCUTANEOUS at 10:54

## 2021-01-17 RX ADMIN — HEPARIN 300 UNITS: 100 SYRINGE at 21:25

## 2021-01-17 RX ADMIN — INSULIN LISPRO 6 UNITS: 100 INJECTION, SOLUTION INTRAVENOUS; SUBCUTANEOUS at 18:05

## 2021-01-17 RX ADMIN — MIDAZOLAM 2 MG: 1 INJECTION INTRAMUSCULAR; INTRAVENOUS at 10:03

## 2021-01-17 RX ADMIN — FENTANYL CITRATE 50 MCG: 50 INJECTION, SOLUTION INTRAMUSCULAR; INTRAVENOUS at 10:21

## 2021-01-17 RX ADMIN — Medication 2 G: at 18:01

## 2021-01-17 RX ADMIN — INSULIN LISPRO 1 UNITS: 100 INJECTION, SOLUTION INTRAVENOUS; SUBCUTANEOUS at 21:31

## 2021-01-17 RX ADMIN — OXYCODONE HYDROCHLORIDE AND ACETAMINOPHEN 1 TABLET: 10; 325 TABLET ORAL at 00:27

## 2021-01-17 RX ADMIN — SODIUM CHLORIDE 3 G: 900 INJECTION INTRAVENOUS at 06:02

## 2021-01-17 RX ADMIN — LIDOCAINE HYDROCHLORIDE 80 MG: 20 INJECTION, SOLUTION INTRAVENOUS at 10:04

## 2021-01-17 RX ADMIN — FENTANYL CITRATE 50 MCG: 50 INJECTION, SOLUTION INTRAMUSCULAR; INTRAVENOUS at 10:39

## 2021-01-17 RX ADMIN — DAPTOMYCIN 450 MG: 500 INJECTION, POWDER, LYOPHILIZED, FOR SOLUTION INTRAVENOUS at 14:19

## 2021-01-17 RX ADMIN — SODIUM CHLORIDE 3 G: 900 INJECTION INTRAVENOUS at 00:27

## 2021-01-17 RX ADMIN — PROPOFOL 200 MG: 10 INJECTION, EMULSION INTRAVENOUS at 10:04

## 2021-01-17 RX ADMIN — SODIUM CHLORIDE, PRESERVATIVE FREE 10 ML: 5 INJECTION INTRAVENOUS at 21:26

## 2021-01-17 RX ADMIN — MORPHINE SULFATE 2 MG: 2 INJECTION, SOLUTION INTRAMUSCULAR; INTRAVENOUS at 18:11

## 2021-01-17 ASSESSMENT — PULMONARY FUNCTION TESTS
PIF_VALUE: 24
PIF_VALUE: 3
PIF_VALUE: 0
PIF_VALUE: 3
PIF_VALUE: 4
PIF_VALUE: 3
PIF_VALUE: 23
PIF_VALUE: 21
PIF_VALUE: 22
PIF_VALUE: 20
PIF_VALUE: 1
PIF_VALUE: 3
PIF_VALUE: 22
PIF_VALUE: 24
PIF_VALUE: 24
PIF_VALUE: 21
PIF_VALUE: 23
PIF_VALUE: 3
PIF_VALUE: 3
PIF_VALUE: 29
PIF_VALUE: 2
PIF_VALUE: 20
PIF_VALUE: 22
PIF_VALUE: 1
PIF_VALUE: 26
PIF_VALUE: 31
PIF_VALUE: 22
PIF_VALUE: 3
PIF_VALUE: 19
PIF_VALUE: 19
PIF_VALUE: 1
PIF_VALUE: 18

## 2021-01-17 ASSESSMENT — PAIN SCALES - GENERAL
PAINLEVEL_OUTOF10: 10
PAINLEVEL_OUTOF10: 9
PAINLEVEL_OUTOF10: 0
PAINLEVEL_OUTOF10: 0
PAINLEVEL_OUTOF10: 9
PAINLEVEL_OUTOF10: 9
PAINLEVEL_OUTOF10: 8

## 2021-01-17 ASSESSMENT — PAIN DESCRIPTION - PROGRESSION
CLINICAL_PROGRESSION: NOT CHANGED
CLINICAL_PROGRESSION: NOT CHANGED

## 2021-01-17 ASSESSMENT — PAIN DESCRIPTION - ORIENTATION: ORIENTATION: RIGHT

## 2021-01-17 ASSESSMENT — PAIN DESCRIPTION - LOCATION: LOCATION: HEAD;WRIST

## 2021-01-17 ASSESSMENT — PAIN DESCRIPTION - PAIN TYPE: TYPE: ACUTE PAIN

## 2021-01-17 NOTE — PLAN OF CARE
Problem: Pain:  Goal: Pain level will decrease  Description: Pain level will decrease  1/17/2021 1011 by Goyo Hdez RN  Outcome: Met This Shift  1/17/2021 0029 by Jerson Beck RN  Outcome: Not Met This Shift     Problem: Pain:  Goal: Control of acute pain  Description: Control of acute pain  1/17/2021 1011 by Goyo Hdez RN  Outcome: Met This Shift  1/17/2021 0029 by Jerson Beck RN  Outcome: Not Met This Shift

## 2021-01-17 NOTE — PROGRESS NOTES
Ekg machine on unit is not working properly. It continues to print EKGs even when the button is not being pushed. Patient has full chest of hair and 2 leads would not register.  Got the best ekg I could for surgery today

## 2021-01-17 NOTE — PROGRESS NOTES
[address] [place] [address] [facility]  Progress Note    SUBJECTIVE:  CC: right hand swelling, pain    Patient is tolerating medications. No reported adverse drug reactions. No nausea, vomiting, diarrhea. Afebrile. Room air. Ambulating about room. s/p I&D of the right hand - socorro pus found at the wrist joint - a penrose drain was placed    Review of systems:  As stated above in the chief complaint, otherwise negative. Medications:  Scheduled Meds:   sodium chloride flush  10 mL Intravenous 2 times per day    ceFAZolin (ANCEF) IVPB  2 g Intravenous Q8H    ampicillin-sulbactam  3 g Intravenous Q6H    daptomycin (CUBICIN) IVPB  6 mg/kg (Ideal) Intravenous Q24H    enoxaparin  40 mg Subcutaneous Daily    atorvastatin  40 mg Oral Daily    levothyroxine  50 mcg Oral Daily    insulin lispro  0-12 Units Subcutaneous TID WC    insulin lispro  0-6 Units Subcutaneous Nightly     Continuous Infusions:  PRN Meds:sodium chloride flush, oxyCODONE **OR** oxyCODONE, morphine, promethazine **OR** ondansetron, polyethylene glycol, acetaminophen **OR** acetaminophen, oxyCODONE-acetaminophen    OBJECTIVE:  BP (!) 118/91   Pulse 83   Temp 98.2 °F (36.8 °C)   Resp 15   Wt 230 lb 9.6 oz (104.6 kg)   SpO2 92%   BMI 31.27 kg/m²   Temp  Av °F (36.7 °C)  Min: 97 °F (36.1 °C)  Max: 98.5 °F (36.9 °C)  Constitutional: The patient is awake, alert, and oriented. Ambulating in room   Skin: Warm and dry. No rashes were noted. HEENT: Round and reactive pupils. Moist mucous membranes. No ulcerations or thrush. Neck: Supple to movements. Chest: No use of accessory muscles to breathe. Symmetrical expansion. No wheezing, crackles or rhonchi. Cardiovascular: S1 and S2 are rhythmic and regular. No murmurs appreciated. Abdomen: Positive bowel sounds to auscultation. Benign to palpation. No masses felt. No hepatosplenomegaly. Extremities: No clubbing, no cyanosis.  Right hand dressed post op in ace wrap & splints. There is a penrose drain in place as well. - preop       Lines: peripheral    Laboratory and Tests Review:  Lab Results   Component Value Date    WBC 11.7 (H) 01/16/2021    WBC 13.4 (H) 01/14/2021    HGB 13.9 01/16/2021    HCT 42.3 01/16/2021    MCV 84.3 01/16/2021     01/16/2021     Lab Results   Component Value Date    NEUTROABS 10.20 (H) 01/14/2021     No results found for: CRPHS  Lab Results   Component Value Date    ALT 18 01/14/2021    AST 14 01/14/2021    ALKPHOS 136 (H) 01/14/2021    BILITOT 0.4 01/14/2021     Lab Results   Component Value Date     01/14/2021    K 4.1 01/14/2021     01/14/2021    CO2 23 01/14/2021    BUN 24 01/14/2021    CREATININE 0.8 01/14/2021    GFRAA >60 01/14/2021    LABGLOM >60 01/14/2021    GLUCOSE 110 01/15/2021    PROT 8.6 01/14/2021    LABALBU 4.2 01/14/2021    CALCIUM 10.1 01/14/2021    BILITOT 0.4 01/14/2021    ALKPHOS 136 01/14/2021    AST 14 01/14/2021    ALT 18 01/14/2021     Lab Results   Component Value Date    CRP 19.5 (H) 01/16/2021    CRP 22.2 (H) 01/14/2021     Lab Results   Component Value Date    SEDRATE 102 (H) 01/16/2021     Radiology:  reviewed    Microbiology:   Lab Results   Component Value Date    BC 24 Hours no growth 01/14/2021     Lab Results   Component Value Date    BLOODCULT2 24 Hours no growth 01/14/2021     No results found for: WNDABS  No results found for: RESPSMEAR  No results found for: MPNEUMO, CLAMYDCU, LABLEGI, AFBCX, FUNGSM, LABFUNG  No results found for: CULTRESP  No results found for: CXCATHTIP  No results found for: BFCS  No results found for: CXSURG  No results found for: LABURIN  No results found for: [facility]  No results for input(s): PROCAL in the last 72 hours.     CRP - 22.2 >> 19.5  Sed rate 102     ASSESSMENT:  · Trauma to the right hand with subsequent cellulitis and failure of with oral antibiotic treatment-probably tenosynovitis - s/p I&D of the right hand 1/17- socorro pus found at the wrist joint - a penrose drain was placed. Synovial fluid normal in the MCP & PIP joints right second. · Leukocytosis - improving. 11.7 today. PLAN:  · Continue unasyn & dapto pending cultures   · Follow surgical cultures  · PICC line  · Check final cultures  · Monitor labs    Jef Oli  12:56 PM   Pt seen and examined. Above discussed agree with advanced practice nurse. Labs, cultures, and radiographs reviewed. Face to Face encounter occurred. Changes made as necessary.      Maicol Jones MD  1/17/2021

## 2021-01-17 NOTE — PROGRESS NOTES
Department of Orthopedic Surgery  Resident Progress Note    Patient seen and examined, resting in bed. Patient states his condition has not improved within the last day. Still admits to significant pain in the right wrist and hand. Pain with attempted motion. Also still with paresthesias in the right hand.   Denies fever or chills    VITALS:  BP (!) 146/88   Pulse 96   Temp 98.5 °F (36.9 °C) (Temporal)   Resp 16   SpO2 99%     General: alert and oriented to person, place and time, well-developed and well-nourished, in no acute distress    MUSCULOSKELETAL:   right upper extremity:  · Patient's right upper extremity seen and examined, and compression wrap elevated on Cisco pillow-wrap taken down for exam  · Diffuse edema remains present spanning from right elbow through the right hand  · Erythema most notable over the dorsum of the right hand  · Exquisite tenderness to palpation over the volar and dorsal wrist, dorsal hand, and MCP/PIP joints of the right index finger  · Pain with passive extension at the wrist and index finger  · Compartments soft and compressible  · +AIN/PIN/Ulnar/Median/Radial nerve function intact grossly  · +2/4 Radial pulse, Cap refill <2 sec  · Sensation intact to touch in radial/ulnar/median nerve distributions to hand although global paresthesias in the right hand    CBC:   Lab Results   Component Value Date    WBC 11.7 01/16/2021    HGB 13.9 01/16/2021    HCT 42.3 01/16/2021     01/16/2021     PT/INR:  No results found for: PROTIME, INR      ASSESSMENT  · Right hand cellulitis, possible abscess formation    PLAN      · Due to patient's stagnant condition despite IV antibiotics, we will consider moving forward with formal I&D later this morning  · Continue IV antibiotics per infectious disease recommendations  · Remain n.p.o. for now  · Treatment consent  · Ice and elevation with Cisco pillow  · Compression wrap to right upper extremity  · Plan for OR later this morning  · Discuss with Dr. Tyra Goldsmith

## 2021-01-17 NOTE — BRIEF OP NOTE
Brief Postoperative Note      Patient: Germaine Argueta  YOB: 1970  MRN: [id number]    Date of Procedure: 1/17/2021    Pre-Op Diagnosis: infected right hand    Post-Op Diagnosis: Same       Procedure(s):  HAND INCISION AND DRAINAGE    Surgeon(s):  Wesley Rivera MD    Assistant:  Resident: Eliane Reeves DO; Sophia Rodríguez DO    Anesthesia: General    Estimated Blood Loss (mL): Minimal    Complications: None    Specimens:   ID Type Source Tests Collected by Time Destination   1 : CULTURE RIGHT HAND FOR AEROBIC, ACID FAST, GRAM STAIN AND FUNGUS Body Fluid Fluid CULTURE, FUNGUS, GRAM STAIN, CULTURE, BODY FLUID, CULTURE WITH SMEAR, ACID FAST BACILLIUS Wesley Rivrea MD 1/17/2021 1030    2 : CULTURE RIGHT HAND FOR ANAEROBIC CULTURE Body Fluid Fluid CULTURE, ANAEROBIC Wesley Rivera MD 1/17/2021 1032        Implants:  * No implants in log *      Drains:   Open Drain Right Hand  (Active)       Findings: See operative dictation    Electronically signed by Eliane Reeves DO on 1/17/2021 at 10:59 AM

## 2021-01-17 NOTE — OP NOTE
Operative Note      Patient: Trish Wild  YOB: 1970  MRN: [id number]    Date of Procedure: 1/17/2021    Pre-Op Diagnosis:   1. Septic right wrist joint and cellulitis of the hand  2. Pain over the second MCP joint and second digit PIP joint right hand    Post-Op Diagnosis:   1. Septic right wrist joint  2. Pain and effusion right second MCP joint  3. Pain and effusion right second PIP joint       Procedure:  1. Incision and drainage right septic wrist joint  2. Exploration of right second MCP joint  3. Exploration of right second PIP joint            Surgeon(s):  Diamante Marks MD    Assistant:   Resident: Devora Hernandez DO; Telly Starr DO    Anesthesia: Monitor Anesthesia Care    Estimated Blood Loss (mL): Minimal    Complications: None    Specimens:   ID Type Source Tests Collected by Time Destination   1 : CULTURE RIGHT HAND FOR AEROBIC, ACID FAST, GRAM STAIN AND FUNGUS Body Fluid Fluid CULTURE, FUNGUS, GRAM STAIN, CULTURE, BODY FLUID, CULTURE WITH SMEAR, ACID FAST BACILLIUS Diamante Marks MD 1/17/2021 1030    2 : CULTURE RIGHT HAND FOR ANAEROBIC CULTURE Body Fluid Fluid CULTURE, ANAEROBIC Diamante Marks MD 1/17/2021 1032        Implants:  * No implants in log *      Drains:   Open Drain Right Hand  (Active)       Findings: Merlinda Gong pus in the right wrist joint, normal synovial fluid good string sign from the right second MCP and PIP joints    Detailed Description of Procedure:   Patient was brought operating room in a supine position on a hospital room bed. Patient was transferred to the operating room table by multiple individuals in a safe fashion with anesthesia and control the patient C-spine and airway. Once on the operating room table all points pressure were identified and well-padded. An armboard was brought with the patient's right side. A tourniquet was applied to his right upper arm.   His right upper extremity was sterilely prepped and was taken the PACU in stable condition.       Postoperative plan:  Appreciate infectious disease input and we will await cultures with them for appropriate antibiotic treatment    Continue to follow clinically if improves, hopefully down with surgery if not we will reintervene surgically if necessary    We will need to follow him up in the next 7 to 14 days in the office for a wound check and also most likely require occupational therapy for hand range of motion    Electronically signed by Josué Puente MD on 1/17/2021 at 10:38 AM

## 2021-01-17 NOTE — PROGRESS NOTES
PICC Placement 1/17/2021    Product number: [id number]   Lot Number: [id number]      Ultrasound: yes   Left Basilic vein:                Upper Arm Circumference: 28cm    Size: 5f    Exposed Length: 1cm    Internal Length: 49cm   Cut: 0   Vein Measurement: .70    Macey Osborne  1/17/2021  4:00 PM

## 2021-01-17 NOTE — PLAN OF CARE
Problem: Pain:  Goal: Pain level will decrease  Description: Pain level will decrease  Outcome: Not Met This Shift  Goal: Control of acute pain  Description: Control of acute pain  Outcome: Not Met This Shift

## 2021-01-17 NOTE — PROGRESS NOTES
HOSPITALIST PROGRESS NOTE  Date: 1/17/2021   Name: Alize Charles   MRN: [id number]   YOB: 1970        Hospital Course:   44 y.o. old male presenting to the emergency department by private vehicle, for traumatic Right hand and wrist pain which occured 7 day(s) prior to arrival.  The complaint is due to injury of the right thumb and wrist one week ago. Patient reports he felt it was a minor injury when he stubbed his thumb at work. 2 days later his index finger and middle finger were significantly swollen so he followed up at a local hospital where x-rays were negative. There was concern for gout so they started him on indomethacin. He followed up with his primary care doctor when it continued to worsen who started him on Keflex and Bactrim; eventually transferred to [facility] for further evaluaton and ortho consultation.     Subjective/Interval Hx:   Patient recently returned from surgery on his right hand stated it was tolerable we will continue to monitor    Objective:   Physical Exam:   BP (!) 153/70   Pulse 80   Temp 97.3 °F (36.3 °C) (Temporal)   Resp 16   Wt 230 lb 9.6 oz (104.6 kg)   SpO2 92%   BMI 31.27 kg/m²   General: no acute distress, well nourished and well hydrated  HEENT: NCAT  Heart: S1S2 RRR  Lungs: Clear to ascultation bilaterally, respiratory effort normal  Abdomen: soft, NT/ND, positive bowel sounds  Extremities: no pitting edema, nontender   Neuro: patient is awake, alert and orientated times 3, no gross deficits  Skin: no rashes or ecchymosis        Meds:   Meds:    sodium chloride flush  10 mL Intravenous 2 times per day    ceFAZolin (ANCEF) IVPB  2 g Intravenous Q8H    lidocaine PF  5 mL Intradermal Once    heparin flush  3 mL Intravenous 2 times per day    ampicillin-sulbactam  3 g Intravenous Q6H    daptomycin (CUBICIN) IVPB  6 mg/kg (Ideal) Intravenous Q24H    enoxaparin  40 mg Subcutaneous Daily    atorvastatin  40 mg Oral Daily    levothyroxine  50 mcg Oral Electronically signed by Dayanara Stout MD on 1/17/2021 at 2:14 PM  [facility]

## 2021-01-18 LAB
ALBUMIN SERPL-MCNC: 3.1 G/DL (ref 3.5–5.2)
ALP BLD-CCNC: 90 U/L (ref 40–129)
ALT SERPL-CCNC: 9 U/L (ref 0–40)
ANION GAP SERPL CALCULATED.3IONS-SCNC: 13 MMOL/L (ref 7–16)
AST SERPL-CCNC: 10 U/L (ref 0–39)
BASOPHILS ABSOLUTE: 0 E9/L (ref 0–0.2)
BASOPHILS RELATIVE PERCENT: 1.1 % (ref 0–2)
BILIRUB SERPL-MCNC: 0.5 MG/DL (ref 0–1.2)
BUN BLDV-MCNC: 17 MG/DL (ref 6–20)
BURR CELLS: ABNORMAL
CALCIUM SERPL-MCNC: 8.7 MG/DL (ref 8.6–10.2)
CHLORIDE BLD-SCNC: 97 MMOL/L (ref 98–107)
CO2: 27 MMOL/L (ref 22–29)
CREAT SERPL-MCNC: 0.7 MG/DL (ref 0.7–1.2)
EOSINOPHILS ABSOLUTE: 0.09 E9/L (ref 0.05–0.5)
EOSINOPHILS RELATIVE PERCENT: 0.9 % (ref 0–6)
GFR AFRICAN AMERICAN: >60
GFR NON-AFRICAN AMERICAN: >60 ML/MIN/1.73
GLUCOSE BLD-MCNC: 194 MG/DL (ref 74–99)
GRAM STAIN ORDERABLE: NORMAL
HCT VFR BLD CALC: 41.7 % (ref 37–54)
HEMOGLOBIN: 13.6 G/DL (ref 12.5–16.5)
LYMPHOCYTES ABSOLUTE: 1.77 E9/L (ref 1.5–4)
LYMPHOCYTES RELATIVE PERCENT: 17.4 % (ref 20–42)
MCH RBC QN AUTO: 27.6 PG (ref 26–35)
MCHC RBC AUTO-ENTMCNC: 32.6 % (ref 32–34.5)
MCV RBC AUTO: 84.6 FL (ref 80–99.9)
METAMYELOCYTES RELATIVE PERCENT: 1.7 % (ref 0–1)
METER GLUCOSE: 113 MG/DL (ref 74–99)
METER GLUCOSE: 161 MG/DL (ref 74–99)
METER GLUCOSE: 185 MG/DL (ref 74–99)
METER GLUCOSE: 222 MG/DL (ref 74–99)
METER GLUCOSE: 281 MG/DL (ref 74–99)
MONOCYTES ABSOLUTE: 1.14 E9/L (ref 0.1–0.95)
MONOCYTES RELATIVE PERCENT: 11.3 % (ref 2–12)
MYELOCYTE PERCENT: 3.5 % (ref 0–0)
NEUTROPHILS ABSOLUTE: 7.28 E9/L (ref 1.8–7.3)
NEUTROPHILS RELATIVE PERCENT: 65.2 % (ref 43–80)
OVALOCYTES: ABNORMAL
PDW BLD-RTO: 13.8 FL (ref 11.5–15)
PLATELET # BLD: 304 E9/L (ref 130–450)
PMV BLD AUTO: 9.3 FL (ref 7–12)
POIKILOCYTES: ABNORMAL
POLYCHROMASIA: ABNORMAL
POTASSIUM SERPL-SCNC: 3.7 MMOL/L (ref 3.5–5)
RBC # BLD: 4.93 E12/L (ref 3.8–5.8)
SODIUM BLD-SCNC: 137 MMOL/L (ref 132–146)
TOTAL PROTEIN: 6.7 G/DL (ref 6.4–8.3)
WBC # BLD: 10.4 E9/L (ref 4.5–11.5)

## 2021-01-18 PROCEDURE — 1200000000 HC SEMI PRIVATE

## 2021-01-18 PROCEDURE — 6370000000 HC RX 637 (ALT 250 FOR IP): Performed by: EMERGENCY MEDICINE

## 2021-01-18 PROCEDURE — 6360000002 HC RX W HCPCS: Performed by: ORTHOPAEDIC SURGERY

## 2021-01-18 PROCEDURE — 6360000002 HC RX W HCPCS: Performed by: REGISTERED NURSE

## 2021-01-18 PROCEDURE — 6360000002 HC RX W HCPCS: Performed by: EMERGENCY MEDICINE

## 2021-01-18 PROCEDURE — 2580000003 HC RX 258: Performed by: REGISTERED NURSE

## 2021-01-18 PROCEDURE — 85025 COMPLETE CBC W/AUTO DIFF WBC: CPT

## 2021-01-18 PROCEDURE — 36415 COLL VENOUS BLD VENIPUNCTURE: CPT

## 2021-01-18 PROCEDURE — 97535 SELF CARE MNGMENT TRAINING: CPT

## 2021-01-18 PROCEDURE — 97165 OT EVAL LOW COMPLEX 30 MIN: CPT

## 2021-01-18 PROCEDURE — 6370000000 HC RX 637 (ALT 250 FOR IP): Performed by: STUDENT IN AN ORGANIZED HEALTH CARE EDUCATION/TRAINING PROGRAM

## 2021-01-18 PROCEDURE — 82962 GLUCOSE BLOOD TEST: CPT

## 2021-01-18 PROCEDURE — 6360000002 HC RX W HCPCS: Performed by: SPECIALIST

## 2021-01-18 PROCEDURE — 6360000002 HC RX W HCPCS: Performed by: STUDENT IN AN ORGANIZED HEALTH CARE EDUCATION/TRAINING PROGRAM

## 2021-01-18 PROCEDURE — 2580000003 HC RX 258: Performed by: STUDENT IN AN ORGANIZED HEALTH CARE EDUCATION/TRAINING PROGRAM

## 2021-01-18 PROCEDURE — 80053 COMPREHEN METABOLIC PANEL: CPT

## 2021-01-18 PROCEDURE — 2580000003 HC RX 258: Performed by: SPECIALIST

## 2021-01-18 RX ORDER — DICLOFENAC SODIUM 75 MG/1
75 TABLET, DELAYED RELEASE ORAL 2 TIMES DAILY
COMMUNITY
End: 2021-05-12

## 2021-01-18 RX ORDER — NICOTINE POLACRILEX 4 MG
15 LOZENGE BUCCAL PRN
Status: DISCONTINUED | OUTPATIENT
Start: 2021-01-18 | End: 2021-01-19 | Stop reason: HOSPADM

## 2021-01-18 RX ORDER — LEVOTHYROXINE SODIUM 0.1 MG/1
100 TABLET ORAL DAILY
Status: DISCONTINUED | OUTPATIENT
Start: 2021-01-19 | End: 2021-01-19 | Stop reason: HOSPADM

## 2021-01-18 RX ORDER — DEXTROSE MONOHYDRATE 50 MG/ML
100 INJECTION, SOLUTION INTRAVENOUS PRN
Status: DISCONTINUED | OUTPATIENT
Start: 2021-01-18 | End: 2021-01-19 | Stop reason: HOSPADM

## 2021-01-18 RX ORDER — DEXTROSE MONOHYDRATE 25 G/50ML
12.5 INJECTION, SOLUTION INTRAVENOUS PRN
Status: DISCONTINUED | OUTPATIENT
Start: 2021-01-18 | End: 2021-01-19 | Stop reason: HOSPADM

## 2021-01-18 RX ADMIN — MORPHINE SULFATE 2 MG: 2 INJECTION, SOLUTION INTRAMUSCULAR; INTRAVENOUS at 00:03

## 2021-01-18 RX ADMIN — SODIUM CHLORIDE 3 G: 900 INJECTION INTRAVENOUS at 12:36

## 2021-01-18 RX ADMIN — HEPARIN 300 UNITS: 100 SYRINGE at 09:24

## 2021-01-18 RX ADMIN — HEPARIN 300 UNITS: 100 SYRINGE at 20:09

## 2021-01-18 RX ADMIN — MORPHINE SULFATE 2 MG: 2 INJECTION, SOLUTION INTRAMUSCULAR; INTRAVENOUS at 15:00

## 2021-01-18 RX ADMIN — MORPHINE SULFATE 2 MG: 2 INJECTION, SOLUTION INTRAMUSCULAR; INTRAVENOUS at 04:55

## 2021-01-18 RX ADMIN — ENOXAPARIN SODIUM 40 MG: 40 INJECTION SUBCUTANEOUS at 09:24

## 2021-01-18 RX ADMIN — OXYCODONE HYDROCHLORIDE 10 MG: 10 TABLET ORAL at 11:54

## 2021-01-18 RX ADMIN — CEFTRIAXONE SODIUM 2 G: 2 INJECTION, POWDER, FOR SOLUTION INTRAMUSCULAR; INTRAVENOUS at 14:59

## 2021-01-18 RX ADMIN — SODIUM CHLORIDE, PRESERVATIVE FREE 10 ML: 5 INJECTION INTRAVENOUS at 20:08

## 2021-01-18 RX ADMIN — INSULIN LISPRO 4 UNITS: 100 INJECTION, SOLUTION INTRAVENOUS; SUBCUTANEOUS at 17:02

## 2021-01-18 RX ADMIN — Medication 2 G: at 01:10

## 2021-01-18 RX ADMIN — MORPHINE SULFATE 2 MG: 2 INJECTION, SOLUTION INTRAMUSCULAR; INTRAVENOUS at 20:08

## 2021-01-18 RX ADMIN — SODIUM CHLORIDE, PRESERVATIVE FREE 10 ML: 5 INJECTION INTRAVENOUS at 09:24

## 2021-01-18 RX ADMIN — INSULIN LISPRO 2 UNITS: 100 INJECTION, SOLUTION INTRAVENOUS; SUBCUTANEOUS at 09:28

## 2021-01-18 RX ADMIN — INSULIN LISPRO 2 UNITS: 100 INJECTION, SOLUTION INTRAVENOUS; SUBCUTANEOUS at 11:55

## 2021-01-18 RX ADMIN — SODIUM CHLORIDE 3 G: 900 INJECTION INTRAVENOUS at 00:06

## 2021-01-18 RX ADMIN — OXYCODONE HYDROCHLORIDE 10 MG: 10 TABLET ORAL at 17:02

## 2021-01-18 RX ADMIN — SODIUM CHLORIDE 3 G: 900 INJECTION INTRAVENOUS at 04:57

## 2021-01-18 RX ADMIN — ATORVASTATIN CALCIUM 40 MG: 40 TABLET, FILM COATED ORAL at 09:24

## 2021-01-18 RX ADMIN — INSULIN LISPRO 3 UNITS: 100 INJECTION, SOLUTION INTRAVENOUS; SUBCUTANEOUS at 20:09

## 2021-01-18 RX ADMIN — MORPHINE SULFATE 2 MG: 2 INJECTION, SOLUTION INTRAMUSCULAR; INTRAVENOUS at 09:24

## 2021-01-18 RX ADMIN — LEVOTHYROXINE SODIUM 50 MCG: 0.05 TABLET ORAL at 05:16

## 2021-01-18 ASSESSMENT — PAIN DESCRIPTION - PAIN TYPE: TYPE: ACUTE PAIN

## 2021-01-18 ASSESSMENT — PAIN SCALES - GENERAL
PAINLEVEL_OUTOF10: 9
PAINLEVEL_OUTOF10: 10
PAINLEVEL_OUTOF10: 7

## 2021-01-18 ASSESSMENT — PAIN DESCRIPTION - ONSET
ONSET: ON-GOING
ONSET: ON-GOING

## 2021-01-18 ASSESSMENT — PAIN DESCRIPTION - PROGRESSION
CLINICAL_PROGRESSION: NOT CHANGED
CLINICAL_PROGRESSION: NOT CHANGED

## 2021-01-18 ASSESSMENT — PAIN DESCRIPTION - ORIENTATION
ORIENTATION: RIGHT
ORIENTATION: RIGHT

## 2021-01-18 ASSESSMENT — PAIN DESCRIPTION - FREQUENCY: FREQUENCY: CONTINUOUS

## 2021-01-18 ASSESSMENT — PAIN DESCRIPTION - LOCATION: LOCATION: HEAD;WRIST

## 2021-01-18 ASSESSMENT — PAIN - FUNCTIONAL ASSESSMENT: PAIN_FUNCTIONAL_ASSESSMENT: ACTIVITIES ARE NOT PREVENTED

## 2021-01-18 NOTE — PROGRESS NOTES
HOSPITALIST PROGRESS NOTE  Date: 1/18/2021   Name: Nguyen Sánchez   MRN: [id number]   YOB: 1970        Hospital Course:   44 y.o. old male presenting to the emergency department by private vehicle, for traumatic Right hand and wrist pain which occured 7 day(s) prior to arrival.  The complaint is due to injury of the right thumb and wrist one week ago. Patient reports he felt it was a minor injury when he stubbed his thumb at work. 2 days later his index finger and middle finger were significantly swollen so he followed up at a local hospital where x-rays were negative. There was concern for gout so they started him on indomethacin. He followed up with his primary care doctor when it continued to worsen who started him on Keflex and Bactrim; eventually transferred to [facility] for further evaluaton and ortho consultation.     Subjective/Interval Hx:   Patient recently returned from surgery on his right hand stated it was tolerable we will continue to monitor    Objective:   Physical Exam:   BP (!) 141/89   Pulse 95   Temp 98 °F (36.7 °C) (Temporal)   Resp 18   Wt 232 lb 12.8 oz (105.6 kg)   SpO2 93%   BMI 31.57 kg/m²   General: no acute distress, well nourished and well hydrated  HEENT: NCAT  Heart: S1S2 RRR  Lungs: Clear to ascultation bilaterally, respiratory effort normal  Abdomen: soft, NT/ND, positive bowel sounds  Extremities: no pitting edema, nontender   Neuro: patient is awake, alert and orientated times 3, no gross deficits  Skin: no rashes or ecchymosis        Meds:   Meds:    cefTRIAXone (ROCEPHIN) IV  2 g Intravenous Q24H    sodium chloride flush  10 mL Intravenous 2 times per day    lidocaine PF  5 mL Intradermal Once    heparin flush  3 mL Intravenous 2 times per day    enoxaparin  40 mg Subcutaneous Daily    atorvastatin  40 mg Oral Daily    levothyroxine  50 mcg Oral Daily    insulin lispro  0-12 Units Subcutaneous TID WC    insulin lispro  0-6 Units Subcutaneous Nightly Infusions:   PRN Meds:     sodium chloride flush, 10 mL, PRN      oxyCODONE, 5 mg, Q4H PRN    Or      oxyCODONE, 10 mg, Q4H PRN      sodium chloride flush, 10 mL, PRN      heparin flush, 3 mL, PRN      morphine, 2 mg, Q4H PRN      promethazine, 12.5 mg, Q6H PRN    Or      ondansetron, 4 mg, Q6H PRN      polyethylene glycol, 17 g, Daily PRN      acetaminophen, 650 mg, Q6H PRN    Or      acetaminophen, 650 mg, Q6H PRN      oxyCODONE-acetaminophen, 1 tablet, Q6H PRN        Data/Labs:     Recent Labs     01/16/21  1238 01/18/21  1100   WBC 11.7* 10.4   HGB 13.9 13.6   HCT 42.3 41.7    304      Recent Labs     01/18/21  1100      K 3.7   CL 97*   CO2 27   BUN 17   CREATININE 0.7     Recent Labs     01/18/21  1100   AST 10   ALT 9   BILITOT 0.5   ALKPHOS 90     No results for input(s): INR in the last 72 hours. No results for input(s): CKTOTAL, CKMB, CKMBINDEX, TROPONINT in the last 72 hours. I/O last 3 completed shifts: In: 1448 [P.O.:950; I.V.:700]  Out: 5 [Blood:5]    Intake/Output Summary (Last 24 hours) at 1/18/2021 1323  Last data filed at 1/18/2021 0518  Gross per 24 hour   Intake 1250 ml   Output --   Net 1250 ml        Assessment/Plan:   1. Acute right hand infection-orthopedic surgery has been consulted patient has been having soft tissue edema in the right hand but no abscess formation we will continue IV antibiotics with ID following for recommendations  01/17/2021-right hand infection status post incision and drainage per surgery today, patient tolerated procedure pain is controlled with medication we will continue to monitor. 2. Non-insulin-dependent diabetes mellitus-sliding scale, diabetic diet, monitor blood glucose  3. Hypothyroidism-Synthroid daily  4.  Hyperlipidemia-      DVT Prophylaxis: Lovenox  Diet: DIET GENERAL;  Code Status: Full Code    Dispo when stable     Electronically signed by Joaquin Luciano MD on 1/18/2021 at [facility]

## 2021-01-18 NOTE — PROGRESS NOTES
Department of Orthopedic Surgery  Resident Progress Note    Seen and examined this morning. He is still having some pain to the right wrist and hand. At times he has some subjective changes in sensation in the second and third digits. He has regained some motion to the fingers. No chest pain, shortness of breath, fever, chills, nausea or vomiting. No acute events overnight.       VITALS:  BP (!) 166/86   Pulse 92   Temp 98.5 °F (36.9 °C) (Temporal)   Resp 16   Wt 232 lb 12.8 oz (105.6 kg)   SpO2 95%   BMI 31.57 kg/m²     General: alert and oriented to person, place and time, well-developed and well-nourished, in no acute distress    MUSCULOSKELETAL:   right upper extremity:  · Patient's right upper extremity with surgical drain showing minimal drainage, hand wrapped with soft wrap and ACE bandage  · Flexion and extension intact to the digits and thumb, limited by stiffness and pain, predominantly the second and third digits  · No tenderness to palpation or erythema at the mid to proximal forearm  · Compartments soft and compressible  · Good capillary refill to all digits, fingers warm and well-perfused  · Sensation intact to the distal fingertips at the ulnar and radial aspect of all digits    CBC:   Lab Results   Component Value Date    WBC 11.7 01/16/2021    HGB 13.9 01/16/2021    HCT 42.3 01/16/2021     01/16/2021     PT/INR:  No results found for: PROTIME, INR      ASSESSMENT  · S/P right hand incision and drainage 1/17/21    PLAN      · Nonweightbearing to the right hand, elevation in a Cisco pillow  · Continue IV antibiotics per infectious disease recommendations  · Ice as needed for swelling and pain  · Pain medications, PO per admitting   · Gram stain, surgical cultures pending  · Discuss with attending

## 2021-01-18 NOTE — PROGRESS NOTES
OCCUPATIONAL THERAPY INITIAL EVALUATION      Date:2021  Patient Name: Jordan Gill  MRN: [id number]  : 1970  Room: [address] [place], [place] [address]Y      [address], OTR/L [id number]    AM-PAC Daily Activity Raw Score:   Recommended Adaptive Equipment: none     Diagnosis: Cellulitis [L03.90]     Referring physician: Patricia Hopper DO  Surgery: On 21  1. Incision and drainage right septic wrist joint  2. Exploration of right second MCP joint  3. Exploration of right second PIP joint   Pertinent Medical History: DM    Precautions:  NWB R UE, chelsea pillow     Home Living: Pt lives alone in 1 floor home. Bathroom setup: tub/shower, standard commode   Equipment owned: n/a    Prior Level of Function: independent with ADLs , independent with IADLs; ambulated independently w/o AD  Driving: yes  Girlfriend and/or mother able to assist if needed    Pain Level: Pt c/o 5-6/10 R hand this session ; reinforced pain management/protection strategies and NWB R UE    Cognition: A&O: 4/4; Follows 1-2 step directions   Memory:  good    Sequencing:  good    Problem solving:  good    Judgement/safety:  good      Functional Assessment:   Initial Eval Status  Date: 21 Treatment Status  Date: Short Term Goals/LTG  Treatment frequency: 2-3 additional sessions for ADL training   Feeding Stand by Assist   Modified [place]    Grooming Supervision/setup  Clothing management   Modified [place]    UB Dressing Supervision   Modified [place]    LB Dressing Stand by Assist   Modified [place]    Bathing Stand by Assist  Modified [place]    Toileting Supervision   Modified [place]    Bed Mobility  Rolling: Independent   Supine to sit:  Independent   Sit to supine: Independent   -   Functional Transfers Independent  Various surfaces  -   Functional Mobility Independent w/o AD  In room and bathrm  -   Balance Sitting: Independent  Standing: Independent no AD     Activity Tolerance Good testing/informal observation of tasks, assessment of data and education on plan of care and goals. Assessment of current deficits  Functional mobility []  ADLs [x] Strength [x]  Cognition []  Functional transfers  [] IADLs [] Safety Awareness []  Endurance []  Fine Motor Coordination [x] Balance [] Vision/perception [] Sensation []   Gross Motor Coordination [] ROM [x] Delirium []                  Motor Control []    Plan of Care: Pt could benefit from 2-3 additional treatment sessions to maximize independence w/ ADL's  Instruction/training on adapted ADL techniques and AE recommendations to increase functional independence within precautions  Training on energy conservation strategies/techniques to improve independence/tolerance for self-care routine  Recommendation of environmental modifications for increased safety with functional transfers/mobility and ADLs  Therapeutic exercise to improve motor endurance, ROM, and functional strength for ADLs/functional transfers  Positioning to improve functional independence    Rehab Potential: Good for established goals    Patient / Family Goal: Not stated     Patient and/or family were instructed on diagnosis and plan of care. Demonstrated good understanding. [] Malnutrition indicators have been identified and nursing has been notified to ensure a dietitian consult is ordered.        Low Evaluation completed +              Time In: 08:47  Time Out: 09:02  Total Treatment Time: 13 minutes   Min Units   OT Eval Low 97165 X 1   OT Eval Medium 52022     OT Eval High 00615     OT Re-Eval [id number]     Therapeutic Ex 47217     Therapeutic Activities [id number] 5 0   ADL/Self Care 11979 8 1   Orthotic Management 04445     Neuro Re-Ed [address], OTR/L [id number]

## 2021-01-18 NOTE — PROGRESS NOTES
[address] [place] [address] [facility]  Progress Note    SUBJECTIVE:  CC: right hand swelling, pain    Patient is tolerating medications. No reported adverse drug reactions. No nausea, vomiting, diarrhea. Afebrile. Room air.    s/p I&D of the right hand - socorro pus found at the wrist joint - a penrose drain was placed -- pain is controlled. Hand is elevated on pillow, started PT today     Review of systems:  As stated above in the chief complaint, otherwise negative. Medications:  Scheduled Meds:   sodium chloride flush  10 mL Intravenous 2 times per day    lidocaine PF  5 mL Intradermal Once    heparin flush  3 mL Intravenous 2 times per day    ampicillin-sulbactam  3 g Intravenous Q6H    daptomycin (CUBICIN) IVPB  6 mg/kg (Ideal) Intravenous Q24H    enoxaparin  40 mg Subcutaneous Daily    atorvastatin  40 mg Oral Daily    levothyroxine  50 mcg Oral Daily    insulin lispro  0-12 Units Subcutaneous TID WC    insulin lispro  0-6 Units Subcutaneous Nightly     Continuous Infusions:  PRN Meds:sodium chloride flush, oxyCODONE **OR** oxyCODONE, sodium chloride flush, heparin flush, morphine, promethazine **OR** ondansetron, polyethylene glycol, acetaminophen **OR** acetaminophen, oxyCODONE-acetaminophen    OBJECTIVE:  BP (!) 141/89   Pulse 95   Temp 98 °F (36.7 °C) (Temporal)   Resp 18   Wt 232 lb 12.8 oz (105.6 kg)   SpO2 93%   BMI 31.57 kg/m²   Temp  Av.8 °F (36.6 °C)  Min: 97 °F (36.1 °C)  Max: 98.5 °F (36.9 °C)  Constitutional: The patient is awake, alert, and oriented. Skin: Warm and dry. No rashes were noted. HEENT: Round and reactive pupils. Moist mucous membranes. No ulcerations or thrush. Neck: Supple to movements. Chest: No use of accessory muscles to breathe. Symmetrical expansion. No wheezing, crackles or rhonchi. Cardiovascular: S1 and S2 are rhythmic and regular. No murmurs appreciated. Abdomen: Positive bowel sounds to auscultation.  Benign to palpation. No masses felt. No hepatosplenomegaly. Extremities: No clubbing, no cyanosis. Right hand dressed post op in ace wrap & splints. There is a penrose drain in place as well. - preop       Lines: PICC left arm 1/17/21    Laboratory and Tests Review:  Lab Results   Component Value Date    WBC 11.7 (H) 01/16/2021    WBC 13.4 (H) 01/14/2021    HGB 13.9 01/16/2021    HCT 42.3 01/16/2021    MCV 84.3 01/16/2021     01/16/2021     Lab Results   Component Value Date    NEUTROABS 10.20 (H) 01/14/2021     No results found for: CRPHS  Lab Results   Component Value Date    ALT 18 01/14/2021    AST 14 01/14/2021    ALKPHOS 136 (H) 01/14/2021    BILITOT 0.4 01/14/2021     Lab Results   Component Value Date     01/14/2021    K 4.1 01/14/2021     01/14/2021    CO2 23 01/14/2021    BUN 24 01/14/2021    CREATININE 0.8 01/14/2021    GFRAA >60 01/14/2021    LABGLOM >60 01/14/2021    GLUCOSE 110 01/15/2021    PROT 8.6 01/14/2021    LABALBU 4.2 01/14/2021    CALCIUM 10.1 01/14/2021    BILITOT 0.4 01/14/2021    ALKPHOS 136 01/14/2021    AST 14 01/14/2021    ALT 18 01/14/2021     Lab Results   Component Value Date    CRP 19.5 (H) 01/16/2021    CRP 22.2 (H) 01/14/2021     Lab Results   Component Value Date    SEDRATE 102 (H) 01/16/2021     Radiology:  reviewed    Microbiology:   Lab Results   Component Value Date    BC 24 Hours no growth 01/14/2021     Lab Results   Component Value Date    BLOODCULT2 24 Hours no growth 01/14/2021     No results found for: WNDABS  No results found for: RESPSMEAR  No results found for: MPNEUMO, CLAMYDCU, LABLEGI, AFBCX, FUNGSM, LABFUNG  No results found for: CULTRESP  No results found for: CXCATHTIP  No results found for: BFCS  No results found for: CXSURG  No results found for: LABURIN  No results found for: [address]   No results for input(s): PROCAL in the last 72 hours.     CRP - 22.2 >> 19.5  Sed rate 102   Gram stain of wound culture - few GPC  Surgical culture - BET[facility]REP [facility] C   Anaerobic culture - pending      ASSESSMENT:  · Trauma to the right hand with subsequent cellulitis and failure of with oral antibiotic treatment-probably tenosynovitis - s/p I&D of the right hand 1/17- socorro pus found at the wrist joint - a penrose drain was placed. Synovial fluid normal in the MCP & PIP joints right second. · Leukocytosis - improved - 10.7     PLAN:  · Consolidate unasyn & dapto to rocephin 2g q24 - med rec done, script on chart  · Follow surgical cultures  · PICC line - will need at least 4 weeks of antibiotics   · Check final cultures  · Monitor labs    Yoselin Bardales  11:01 AM   1/18/2021     Pt seen and examined. Above discussed agree with advanced practice nurse. Labs, cultures, and radiographs reviewed. Face to Face encounter occurred. Changes made as necessary.      Rogelio Yeung MD

## 2021-01-18 NOTE — CARE COORDINATION
Spoke with Transition Plan of Care. Pt lives alone with 5 steps to enter or don to basement. Pt uses no DME and was independent prior to admission. PCP: Connie. Pharmacy: [facility]. Friends/Family will provide transport at discharge. Pt has a pic line. If IV antibiotics are needed at discharge, then Pt wants [facility]. Discharge plan is to return home with Devika Jin. SW/LEILANI to follow for discharge needs. Referral made to [facility]. Corinne Rios checking to see if able to accept case.    Neldon Goldberg, YURIS.W.  [phone]

## 2021-01-18 NOTE — PROGRESS NOTES
Physical Therapy  Name: Gabriela Gannon  Room: 2086/2366-N  Date: 01/18/21    PT consult received and appreciated. Met with pt in room who expressed no needs for PT. States independence with all functional mobility. DC from PT service at this time with no needs.      Danielito Gonzalez, PT, DPT  [id number]

## 2021-01-19 VITALS
BODY MASS INDEX: 31.57 KG/M2 | DIASTOLIC BLOOD PRESSURE: 83 MMHG | OXYGEN SATURATION: 94 % | HEART RATE: 85 BPM | TEMPERATURE: 98.2 F | RESPIRATION RATE: 18 BRPM | WEIGHT: 232.8 LBS | SYSTOLIC BLOOD PRESSURE: 131 MMHG

## 2021-01-19 LAB
ALBUMIN SERPL-MCNC: 3.2 G/DL (ref 3.5–5.2)
ALP BLD-CCNC: 83 U/L (ref 40–129)
ALT SERPL-CCNC: 9 U/L (ref 0–40)
ANAEROBIC CULTURE: NORMAL
ANION GAP SERPL CALCULATED.3IONS-SCNC: 14 MMOL/L (ref 7–16)
AST SERPL-CCNC: 10 U/L (ref 0–39)
BASOPHILS ABSOLUTE: 0.09 E9/L (ref 0–0.2)
BASOPHILS RELATIVE PERCENT: 0.9 % (ref 0–2)
BILIRUB SERPL-MCNC: 0.6 MG/DL (ref 0–1.2)
BLOOD CULTURE, ROUTINE: NORMAL
BUN BLDV-MCNC: 17 MG/DL (ref 6–20)
CALCIUM SERPL-MCNC: 8.8 MG/DL (ref 8.6–10.2)
CHLORIDE BLD-SCNC: 97 MMOL/L (ref 98–107)
CO2: 26 MMOL/L (ref 22–29)
CREAT SERPL-MCNC: 0.8 MG/DL (ref 0.7–1.2)
CULTURE SURGICAL: ABNORMAL
CULTURE, BLOOD 2: NORMAL
EOSINOPHILS ABSOLUTE: 0.44 E9/L (ref 0.05–0.5)
EOSINOPHILS RELATIVE PERCENT: 4.4 % (ref 0–6)
GFR AFRICAN AMERICAN: >60
GFR NON-AFRICAN AMERICAN: >60 ML/MIN/1.73
GLUCOSE BLD-MCNC: 171 MG/DL (ref 74–99)
HCT VFR BLD CALC: 41.2 % (ref 37–54)
HEMOGLOBIN: 13.3 G/DL (ref 12.5–16.5)
LYMPHOCYTES ABSOLUTE: 1.19 E9/L (ref 1.5–4)
LYMPHOCYTES RELATIVE PERCENT: 12.3 % (ref 20–42)
MCH RBC QN AUTO: 27.7 PG (ref 26–35)
MCHC RBC AUTO-ENTMCNC: 32.3 % (ref 32–34.5)
MCV RBC AUTO: 85.8 FL (ref 80–99.9)
METER GLUCOSE: 165 MG/DL (ref 74–99)
METER GLUCOSE: 247 MG/DL (ref 74–99)
MONOCYTES ABSOLUTE: 0.5 E9/L (ref 0.1–0.95)
MONOCYTES RELATIVE PERCENT: 5.3 % (ref 2–12)
NEUTROPHILS ABSOLUTE: 7.62 E9/L (ref 1.8–7.3)
NEUTROPHILS RELATIVE PERCENT: 77.2 % (ref 43–80)
ORGANISM: ABNORMAL
PDW BLD-RTO: 13.8 FL (ref 11.5–15)
PLATELET # BLD: 275 E9/L (ref 130–450)
PMV BLD AUTO: 9.1 FL (ref 7–12)
POTASSIUM SERPL-SCNC: 4 MMOL/L (ref 3.5–5)
RBC # BLD: 4.8 E12/L (ref 3.8–5.8)
RBC # BLD: NORMAL 10*6/UL
SODIUM BLD-SCNC: 137 MMOL/L (ref 132–146)
TOTAL PROTEIN: 6.6 G/DL (ref 6.4–8.3)
WBC # BLD: 9.9 E9/L (ref 4.5–11.5)

## 2021-01-19 PROCEDURE — 6370000000 HC RX 637 (ALT 250 FOR IP): Performed by: HOSPITALIST

## 2021-01-19 PROCEDURE — 36415 COLL VENOUS BLD VENIPUNCTURE: CPT

## 2021-01-19 PROCEDURE — 2580000003 HC RX 258: Performed by: REGISTERED NURSE

## 2021-01-19 PROCEDURE — 6360000002 HC RX W HCPCS: Performed by: REGISTERED NURSE

## 2021-01-19 PROCEDURE — 6370000000 HC RX 637 (ALT 250 FOR IP): Performed by: EMERGENCY MEDICINE

## 2021-01-19 PROCEDURE — 2580000003 HC RX 258: Performed by: STUDENT IN AN ORGANIZED HEALTH CARE EDUCATION/TRAINING PROGRAM

## 2021-01-19 PROCEDURE — 6360000002 HC RX W HCPCS: Performed by: EMERGENCY MEDICINE

## 2021-01-19 PROCEDURE — 6360000002 HC RX W HCPCS: Performed by: ORTHOPAEDIC SURGERY

## 2021-01-19 PROCEDURE — 6370000000 HC RX 637 (ALT 250 FOR IP): Performed by: STUDENT IN AN ORGANIZED HEALTH CARE EDUCATION/TRAINING PROGRAM

## 2021-01-19 PROCEDURE — 85025 COMPLETE CBC W/AUTO DIFF WBC: CPT

## 2021-01-19 PROCEDURE — 82962 GLUCOSE BLOOD TEST: CPT

## 2021-01-19 PROCEDURE — 80053 COMPREHEN METABOLIC PANEL: CPT

## 2021-01-19 RX ADMIN — ATORVASTATIN CALCIUM 40 MG: 40 TABLET, FILM COATED ORAL at 08:21

## 2021-01-19 RX ADMIN — MORPHINE SULFATE 2 MG: 2 INJECTION, SOLUTION INTRAMUSCULAR; INTRAVENOUS at 06:17

## 2021-01-19 RX ADMIN — INSULIN LISPRO 2 UNITS: 100 INJECTION, SOLUTION INTRAVENOUS; SUBCUTANEOUS at 08:23

## 2021-01-19 RX ADMIN — LEVOTHYROXINE SODIUM 100 MCG: 0.1 TABLET ORAL at 05:26

## 2021-01-19 RX ADMIN — MORPHINE SULFATE 2 MG: 2 INJECTION, SOLUTION INTRAMUSCULAR; INTRAVENOUS at 00:44

## 2021-01-19 RX ADMIN — OXYCODONE 5 MG: 5 TABLET ORAL at 08:21

## 2021-01-19 RX ADMIN — MORPHINE SULFATE 2 MG: 2 INJECTION, SOLUTION INTRAMUSCULAR; INTRAVENOUS at 12:27

## 2021-01-19 RX ADMIN — SODIUM CHLORIDE, PRESERVATIVE FREE 10 ML: 5 INJECTION INTRAVENOUS at 08:22

## 2021-01-19 RX ADMIN — HEPARIN 300 UNITS: 100 SYRINGE at 08:21

## 2021-01-19 RX ADMIN — ENOXAPARIN SODIUM 40 MG: 40 INJECTION SUBCUTANEOUS at 08:21

## 2021-01-19 RX ADMIN — CEFTRIAXONE SODIUM 2 G: 2 INJECTION, POWDER, FOR SOLUTION INTRAMUSCULAR; INTRAVENOUS at 15:11

## 2021-01-19 RX ADMIN — INSULIN LISPRO 4 UNITS: 100 INJECTION, SOLUTION INTRAVENOUS; SUBCUTANEOUS at 12:28

## 2021-01-19 ASSESSMENT — PAIN SCALES - GENERAL
PAINLEVEL_OUTOF10: 5
PAINLEVEL_OUTOF10: 7

## 2021-01-19 ASSESSMENT — PAIN DESCRIPTION - FREQUENCY: FREQUENCY: CONTINUOUS

## 2021-01-19 ASSESSMENT — PAIN DESCRIPTION - ONSET: ONSET: ON-GOING

## 2021-01-19 NOTE — CARE COORDINATION
Transition of Care-Call placed to [facility], patient is accepted, however needs IV antibiotic's set up, call placed to [facility] , referral made. H&P, Insurance information, discharge orders will need faxed to [place] at [facility] at [phone]. Met with patient at bedside, his girlfriend and mother will be present to assist with teaching, updated [place] that patient will have today's dose, and will start [facility] 1/20/21.   Electronically signed by Milagro Golden RN on 1/19/2021 at 12:05 PM

## 2021-01-19 NOTE — CARE COORDINATION
Faxed info to [facility]. Called Susie DAMON to make sure faxed received. 611 Destini Larry with Pt and everything ok. Faxed antibiotics  Script to [facility].    Vernette Goldberg, L.S.W.  [phone]

## 2021-01-19 NOTE — PROGRESS NOTES
[address] [place] [address] [facility]  Progress Note    SUBJECTIVE:  CC: right hand swelling, pain    Patient is tolerating medications. No reported adverse drug reactions. No nausea, vomiting, diarrhea. Afebrile. Room air.    s/p I&D of the right hand - socorro pus found at the wrist joint - a penrose drain was placed -- pain is controlled. Hand is elevated on pillow, still with swelling. Dressing was taken down by ortho today. Some improvement in ROM. Review of systems:  As stated above in the chief complaint, otherwise negative. Medications:  Scheduled Meds:   cefTRIAXone (ROCEPHIN) IV  2 g Intravenous Q24H    levothyroxine  100 mcg Oral Daily    sodium chloride flush  10 mL Intravenous 2 times per day    lidocaine PF  5 mL Intradermal Once    heparin flush  3 mL Intravenous 2 times per day    enoxaparin  40 mg Subcutaneous Daily    atorvastatin  40 mg Oral Daily    insulin lispro  0-12 Units Subcutaneous TID WC    insulin lispro  0-6 Units Subcutaneous Nightly     Continuous Infusions:   dextrose       PRN Meds:glucose, dextrose, glucagon (rDNA), dextrose, sodium chloride flush, oxyCODONE **OR** oxyCODONE, sodium chloride flush, heparin flush, morphine, promethazine **OR** ondansetron, polyethylene glycol, acetaminophen **OR** acetaminophen, oxyCODONE-acetaminophen    OBJECTIVE:  /83   Pulse 85   Temp 98.2 °F (36.8 °C) (Temporal)   Resp 18   Wt 232 lb 12.8 oz (105.6 kg)   SpO2 94%   BMI 31.57 kg/m²   Temp  Av.3 °F (36.8 °C)  Min: 98.2 °F (36.8 °C)  Max: 98.4 °F (36.9 °C)  Constitutional: The patient is awake, alert, and oriented. Skin: Warm and dry. No rashes were noted. HEENT: Round and reactive pupils. Moist mucous membranes. No ulcerations or thrush. Neck: Supple to movements. Chest: No use of accessory muscles to breathe. Symmetrical expansion. No wheezing, crackles or rhonchi. Cardiovascular: S1 and S2 are rhythmic and regular.  No murmurs results found for: BFCS  Culture Surgical   Date Value Ref Range Status   01/17/2021 Moderate growth  Final     No results found for: LABURIN  No results found for: [facility]  No results for input(s): PROCAL in the last 72 hours. CRP - 22.2 >> 19.5  Sed rate 102   Gram stain of wound culture - few GPC  Surgical culture - BETA STREP GROUP C   Anaerobic culture - no anerobes isolated      ASSESSMENT:  · Trauma to the right hand with subsequent cellulitis and failure of with oral antibiotic treatment-probably tenosynovitis - s/p I&D of the right hand 1/17- socorro pus found at the wrist joint - a penrose drain was placed. Synovial fluid normal in the MCP & PIP joints right second. · Leukocytosis - improved - 10.7     PLAN:  · Continue rocephin 2g q24 - med rec done, script on chart  · Follow surgical cultures  · PICC line - will need at least 4 weeks of antibiotics   · Check final cultures  · Monitor labs  · Ok to dc from VALENTIN Mondragon  10:52 AM   1/19/2021   Pt seen and examined. Above discussed agree with advanced practice nurse. Labs, cultures, and radiographs reviewed. Face to Face encounter occurred. Changes made as necessary.      Nisha Rene MD

## 2021-01-19 NOTE — PLAN OF CARE
Problem: Pain:  Goal: Pain level will decrease  Description: Pain level will decrease  1/19/2021 0349 by Maudry Gaucher, RN  Outcome: Met This Shift  1/18/2021 1531 by Cornel Franks  Outcome: Met This Shift  Goal: Control of acute pain  Description: Control of acute pain  1/19/2021 0349 by Maudry Gaucher, RN  Outcome: Met This Shift  1/18/2021 1531 by Cornel Franks  Outcome: Met This Shift

## 2021-01-19 NOTE — CARE COORDINATION
Pt's cost for antibiotics at home is $711 that needs pay up front until deductible $600 met; 80% until out of pocket expense is meet then 100% covered. Pt agreed to pay the cost.  Xavier Bundy to inform that Pt is willing to pay. Gave Pt's cell #. Pt's phone went to . Ana Gross requested inform be faxed. Faxed info requested.     Earline Rowland, L.S.W.  [phone]

## 2021-01-19 NOTE — DISCHARGE SUMMARY
Discharge Summary    Admit date: 1/15/2021    Discharge date and time: No discharge date for patient encounter. Admitting Physician: Aurelia Romero MD     Consultants: Orthopedic surgery, ID    Admission Diagnoses:  Acute right hand infection    Discharge Diagnoses and Hospital Course:  · Acute right hand infection-orthopedic surgery followed. Status post incision and drainage 1/17. Will need rocephin for at least 4 weeks. Has PICC line. Follow with orthopedic surgery. [facility]. · Non-insulin-dependent diabetes mellitus-  Restart home meds. · Hypothyroidism-Synthroid daily  · Hyperlipidemia    Discharge Exam:  Vitals:    01/19/21 0815   BP: 131/83   Pulse: 85   Resp: 18   Temp: 98.2 °F (36.8 °C)   SpO2: 94%       General appearance:  awake, alert, and oriented to person, place, time, and purpose; appears stated age and cooperative; no apparent distress no labored breathing  HEENT:  Conjunctivae/corneas clear. Neck: Supple. No jugular venous distention. Respiratory: symmetrical; clear to auscultation bilaterally; no wheezes; no rhonchi; no rales  Cardiovascular: rhythm regular; rate controlled; no murmurs  Abdomen: Soft, nontender, nondistended  Extremities:  peripheral pulses present; no peripheral edema; no ulcers R hand wrapped  Musculoskeletal: No clubbing, cyanosis, no bilateral lower extremity edema. Brisk capillary refill. Skin:  No rashes  on visible skin  Neurologic: awake, alert and following commands    Disposition: home  The patient's condition is fair. At this time the patient is without objective evidence of an acute process requiring continuing hospitalization or inpatient management. They are stable for discharge with outpatient follow-up. I have spoken with the patient and discussed the results of the current hospitalization, in addition to providing specific details for the plan of care and counseling regarding the diagnosis and prognosis.   The plan has been discussed in detail and they are aware of the specific conditions for emergent return, as well as the importance of follow-up. Their questions are answered at this time and they are agreeable with the plan for discharge to home with [facility]     Patient Instructions: Continue IV abx with [facility]. Follow with orthopedics as instructed. Future Appointments   Date Time Provider Areli Aldridge   1/27/2021  7:45 AM Maria Del Carmen Valverde MD  [facility]       Discharge Medications:     Medication List      START taking these medications    cefTRIAXone  infusion  Commonly known as: ROCEPHIN  Infuse 2,000 mg intravenously every 24 hours for 28 days Compound per protocol     oxyCODONE 5 MG immediate release tablet  Commonly known as: Roxicodone  Take 1 tablet by mouth every 6 hours as needed for Pain for up to 5 days. Intended supply: 5 days. Take lowest dose possible to manage pain        CONTINUE taking these medications    atorvastatin 40 MG tablet  Commonly known as: LIPITOR     diclofenac 75 MG EC tablet  Commonly known as: VOLTAREN     HumuLIN 70/30 (70-30) 100 UNIT per ML injection vial  Generic drug: insulin 70-30     Jardiance 25 MG tablet  Generic drug: empagliflozin     levothyroxine 100 MCG tablet  Commonly known as: SYNTHROID     metFORMIN 850 MG tablet  Commonly known as: GLUCOPHAGE     SUMAtriptan 100 MG tablet  Commonly known as: IMITREX           Where to Get Your Medications      You can get these medications from any pharmacy    Bring a paper prescription for each of these medications  · cefTRIAXone  infusion  · oxyCODONE 5 MG immediate release tablet         Activity: activity as tolerated    Diet: diabetic diet    Wound Care: as directed by orthopedic surgery    Follow-up:    · This patient is instructed to follow-up with her primary care physician. · Patient is instructed to follow-up with the consults listed above as directed by them.   · They are instructed to resume home medications and take new medications as indicated in the list above. · If the patient has a recurrence of symptoms, they are instructed to go to the ED. Preparing for this patient's discharge, including paperwork, orders, instructions, and meeting with patient did require > 30 minutes.     Prema Red DO   11:22 AM  1/19/2021

## 2021-01-19 NOTE — DISCHARGE INSTR - COC
Continuity of Care Form    Patient Name: Maisha Santiago   :  1970  MRN:  [id number]    Admit date:  1/15/2021  Discharge date:  ***    Code Status Order: Full Code   Advance Directives:   [facility] Documentation     Date/Time Healthcare Directive Type of Healthcare Directive Copy in [address] [facility] [address] Agent's Name Healthcare Agent's Phone Number    [phone]  No, patient does not have an advance directive for healthcare treatment -- -- -- -- --          Admitting Physician:  Marge Godwin MD  PCP: Alejandra Cordon MD    Discharging Nurse: [facility] Unit/Room#: 9205/0840-P  Discharging Unit Phone Number: ***    Emergency Contact:   Extended Emergency Contact Information  Primary Emergency Contact: [address] [facility] Phone: [phone]  Mobile Phone: [phone]  Relation: Parent    Past Surgical History:  Past Surgical History:   Procedure Laterality Date    HAND SURGERY Right 2021    HAND INCISION AND DRAINAGE performed by Alejandra Cordon MD at [address] [place] [address]       Immunization History: There is no immunization history for the selected administration types on file for this patient.     Active Problems:  Patient Active Problem List   Diagnosis Code    Cellulitis L03.90       Isolation/Infection:   Isolation          No Isolation        Patient Infection Status     None to display          Nurse Assessment:  Last Vital Signs: /83   Pulse 85   Temp 98.2 °F (36.8 °C) (Temporal)   Resp 18   Wt 232 lb 12.8 oz (105.6 kg)   SpO2 94%   BMI 31.57 kg/m²     Last documented pain score (0-10 scale): Pain Level: 7  Last Weight:   Wt Readings from Last 1 Encounters:   21 232 lb 12.8 oz (105.6 kg)     Mental Status:  {IP PT MENTAL STATUS:}    IV Access:  { JOSE MIGUEL IV ACCESS:127291432}    Nursing Mobility/ADLs:  Walking   {P DME SCRL:150218720}  Transfer  {P DME RLWJ:906655622}  Bathing  {P DME RJDR:055926583}  Dressing  {P DME LJGF:073694823}  Toileting  {CHP DME AQGE:813387867}  Feeding  {[facility] DME YLMZ:760462878}  Med Admin  {[facility] DME MLHB:317731210}  Med Delivery   { JOSE MIGUEL MED Delivery:360761306}    Wound Care Documentation and Therapy:        Elimination:  Continence:   · Bowel: {YES / H}  · Bladder: {YES / RE:46272}  Urinary Catheter: {Urinary Catheter:585990542}   Colostomy/Ileostomy/Ileal Conduit: {YES / DK:00948}       Date of Last BM: ***  No intake or output data in the 24 hours ending 21 1500  No intake/output data recorded.     Safety Concerns:     [address] Safety Concerns:484490846}    Impairments/Disabilities:      [address] Impairments/Disabilities:524762440}    Nutrition Therapy:  Current Nutrition Therapy:   [address] Diet List:103335906}    Routes of Feeding: {[facility] DME Other Feedings:233141025}  Liquids: {Slp liquid thickness:96725}  Daily Fluid Restriction: {CHP DME Yes amt example:518834122}  Last Modified Barium Swallow with Video (Video Swallowing Test): {Done Not Done EVYM:442689218}    Treatments at the Time of Hospital Discharge:   Respiratory Treatments: ***  Oxygen Therapy:  {Therapy; copd oxygen:23297}  Ventilator:    { CC Vent FLJD:925593048}    Rehab Therapies: {THERAPEUTIC INTERVENTION:8234582668}  Weight Bearing Status/Restrictions: [address] [employer]  Weight Bearin}  Other Medical Equipment (for information only, NOT a DME order):  {EQUIPMENT:163499772}  Other Treatments: ***    Patient's personal belongings (please select all that are sent with patient):  {[facility] DME Belongings:408519788}    RN SIGNATURE:  {Esignature:530489823}    CASE MANAGEMENT/SOCIAL WORK SECTION    Inpatient Status Date: ***    Readmission Risk Assessment Score:  Readmission Risk              Risk of Unplanned Readmission:        7           Discharging to Facility/ Agency   · Name:  [facility]  · Address:  · Phone:[phone]  · Fax:    Dialysis Facility (if applicable)   · Name:  · Address:  · Dialysis Schedule:  · Phone:  · Fax:    / signature: {Esignature:329077747}    PHYSICIAN SECTION    Prognosis: {Prognosis:8180443647}    Condition at Discharge: Sarah Lerner Patient Condition:492452744}    Rehab Potential (if transferring to Rehab): {Prognosis:4731805024}    Recommended Labs or Other Treatments After Discharge: ***    Physician Certification: I certify the above information and transfer of Lacho Jennifer  is necessary for the continuing treatment of the diagnosis listed and that he requires {Admit to Appropriate Level of Care:21913} for {GREATER/LESS:856706721} 30 days.      Update Admission H&P: {CHP DME Changes in KSAXB:558636757}    PHYSICIAN SIGNATURE:  {Esignature:700326744}

## 2021-01-19 NOTE — PROGRESS NOTES
Department of Orthopedic Surgery  Resident Progress Note    Patient seen and examined this morning. His pain has been controlled. He has stiffness of the fingers but no changes in sensation. He denies any fevers, chills, nausea or vomiting. No acute events overnight.     VITALS:  /83   Pulse 85   Temp 98.2 °F (36.8 °C) (Temporal)   Resp 18   Wt 232 lb 12.8 oz (105.6 kg)   SpO2 94%   BMI 31.57 kg/m²     General: alert and oriented to person, place and time, well-developed and well-nourished, in no acute distress    MUSCULOSKELETAL:   right upper extremity:  · Dressing taken down from right hand, incisions over the dorsum of the hand as well as second digit are well approximated sutures, no active drainage, no purulence noted  · Flexion and extension intact to all digits, restriction in flexion due to pain and stiffness  · Swelling and warmth remains to the dorsal surface of the hand  · Swelling, tenderness, and erythema does not extend proximal to the hand  · Compartments soft and compressible  · Good capillary refill to all digits, fingers warm and well-perfused  · Sensation intact to the distal fingertips at the ulnar and radial aspect of all digits    CBC:   Lab Results   Component Value Date    WBC 9.9 01/19/2021    HGB 13.3 01/19/2021    HCT 41.2 01/19/2021     01/19/2021     PT/INR:  No results found for: PROTIME, INR      ASSESSMENT  · S/P right hand incision and drainage 1/17/21    PLAN      · Nonweightbearing to the right hand, elevation in a Cisco pillow  · Continue IV antibiotics per infectious disease recommendations, outpatient antibiotics per infectious disease  · Ice as needed for swelling and pain  · Pain medications, PO per admitting   · Discharge okay from orthopedic standpoint, appropriate antibiotic selection for infectious disease on an outpatient basis and follow-up within 1 to 2 weeks with orthopedic surgery as an outpatient

## 2021-01-19 NOTE — PLAN OF CARE
Problem: Pain:  Goal: Pain level will decrease  Description: Pain level will decrease  1/19/2021 1329 by David Casiano  Outcome: Met This Shift  1/19/2021 0349 by Samir Huerta RN  Outcome: Met This Shift  Goal: Control of acute pain  Description: Control of acute pain  1/19/2021 1329 by David Casiano  Outcome: Met This Shift  1/19/2021 0349 by Samir Huerta RN  Outcome: Met This Shift  Goal: Control of chronic pain  Description: Control of chronic pain  Outcome: Met This Shift

## 2021-01-20 ENCOUNTER — TELEPHONE (OUTPATIENT)
Dept: ORTHOPEDIC SURGERY | Age: 51
End: 2021-01-20

## 2021-01-27 ENCOUNTER — OFFICE VISIT (OUTPATIENT)
Dept: ORTHOPEDIC SURGERY | Age: 51
End: 2021-01-27
Payer: COMMERCIAL

## 2021-01-27 VITALS — TEMPERATURE: 98.6 F

## 2021-01-27 DIAGNOSIS — M00.231 STREPTOCOCCAL ARTHRITIS OF RIGHT WRIST (HCC): Primary | ICD-10-CM

## 2021-01-27 PROCEDURE — 99024 POSTOP FOLLOW-UP VISIT: CPT | Performed by: PHYSICIAN ASSISTANT

## 2021-01-27 PROCEDURE — 99212 OFFICE O/P EST SF 10 MIN: CPT

## 2021-01-27 NOTE — LETTER
[address]  [address]  [place] [address]  Phone: [phone]  Fax: [phone]    Suleiman Huerta        January 27, 2021     Patient: Gabriela Gannon   YOB: 1970   Date of Visit: 1/27/2021       To Whom It May Concern: It is my medical opinion that Gabriela Gannon may return to light duty immediately with the following restrictions: no work involving the right hand . These restrictions should be in place until cleared by a medical provider. His next appointment will be in 7-10 days. If you have any questions or concerns, please don't hesitate to call.     Sincerely,        Seda Adams PA-C

## 2021-01-27 NOTE — PATIENT INSTRUCTIONS
Future Appointments   Date Time Provider Areli Aldridge   3/2/2021 10:45 AM ESSENCE Kirby - CNP [facility]

## 2021-01-27 NOTE — PROGRESS NOTES
.  Chief Complaint   Patient presents with    Follow-up     R hand I&D 1/17. Pain 6/10, denies numbness or tingling, fever or chills. Swelling to hand and fingers, limited flexion in fingers.  Other     Sutures to dorsal wrist, R index finger       OP:SURGEON: Dr. Ilene Marino MD  DATE OF PROCEDURE: 1/17/21  PROCEDURE:1. Incision and drainage right septic wrist joint  2. Exploration of right second MCP joint  3. Exploration of right second PIP joint    Subjective:  Octavio Feng is approximately 10 days follow-up from the above surgery. Patient is WBAT on that extremity. Pain to extremity is 4 on a scale of 1 - 10 and is  taking pain medication, oxycodone as well as using ice and elevation. They complains of paresthesias in the ulnar distribution that are intermittent and also has mild burning sensation to the dorsal R hand closer to radial aspect. Patient is not participating in therapy. Denies fever, chills, myalgias, diaphoresis, or issues with current abx therapy. Review of Systems -    General ROS: negative for - chills, fatigue, fever or night sweats  Respiratory ROS: no cough, shortness of breath, or wheezing  Cardiovascular ROS: no chest pain or dyspnea on exertion  Gastrointestinal ROS: no abdominal pain, nausea, vomiting, diarrhea, constipation,or black or bloody stools  Genitourinary: no hematuria, dysuria, or incontinence   Musculoskeletal ROS: negative for -back or neck pain or stiffness, also see HPI  Neurological ROS: no TIA or stroke symptoms       Objective:    General: Alert and oriented X 3, normocephalic atraumatic, external ears and eye normal, sclera clear, no acute distress, respirations easy and unlabored with no audible wheezes, skin warm and dry, speech and dress appropriate for noted age, affect euthymic.     Extremity:  Right Upper Extremity  Skin is clean dry and intact  Moderate edema noted globally about the hand and all fingers and thumb Radial pulse palpable, fingers warm with BCR  Flex/extension extremely limited to all fingers, thumb, and wrist  Compartments still soft and compressible   Subjectively states sensation intact to radial/medial/ulnar distribution  Incision well approximated with no redness or warmth, sutures intact - scant serous pinpoint drainage to proximal incision, no drainage to 2 distal incisions       Temp 98.6 °F (37 °C)         Assessment:   Diagnosis Orders   1. Streptococcal arthritis of right wrist (HCC)  Amb External Referral To Occupational Therapy       Plan:  ? Reviewed x-rays with patient today in office   ? Limited WB of the R hand until all sutures are removed   ? Removed and Steri-Strips placed to the distal 2 incisions. Patient tolerated this well. No submerging or soaking the hand. Showering is okay, be sure to thoroughly pat dry. Dry dressings and Ace wrap placed over fingers/hand today. Maintain dressings changing them as needed. ? Continue antibiotics per infectious disease. Continue following up with infectious disease. ? Continue monitoring for signs/sxs of infection. These were discussed with patient patient verbalized understanding. ? External referral to Occupational Therapy given to patient today. Follow up in 7-10 days for removal of remaining sutures     Electronically signed by Eric Garrett PA-C on 1/27/2021 at 8:14 AM  Note: This report was completed using [employer] voiced recognition software. Every effort has been made to ensure accuracy; however, inadvertent computerized transcription errors may be present.

## 2021-01-29 ENCOUNTER — TELEPHONE (OUTPATIENT)
Dept: ORTHOPEDIC SURGERY | Age: 51
End: 2021-01-29

## 2021-01-29 NOTE — TELEPHONE ENCOUNTER
Patient's latest Lab Results were received today 1/29/2021, Scanned into Patient's media for you to review. Routed to Providers.

## 2021-02-02 ENCOUNTER — TELEPHONE (OUTPATIENT)
Dept: ORTHOPEDIC SURGERY | Age: 51
End: 2021-02-02

## 2021-02-02 NOTE — TELEPHONE ENCOUNTER
[facility] results collected on 02/10/21. Scanned into media and routed to providers for review.

## 2021-02-03 ENCOUNTER — OFFICE VISIT (OUTPATIENT)
Dept: ORTHOPEDIC SURGERY | Age: 51
End: 2021-02-03
Payer: COMMERCIAL

## 2021-02-03 VITALS — TEMPERATURE: 98.5 F

## 2021-02-03 DIAGNOSIS — M00.231 STREPTOCOCCAL ARTHRITIS OF RIGHT WRIST (HCC): Primary | ICD-10-CM

## 2021-02-03 PROCEDURE — 99024 POSTOP FOLLOW-UP VISIT: CPT | Performed by: PHYSICIAN ASSISTANT

## 2021-02-03 PROCEDURE — 99212 OFFICE O/P EST SF 10 MIN: CPT

## 2021-02-03 NOTE — PROGRESS NOTES
Chief Complaint   Patient presents with    Wrist Injury     RIGHT WRIST; I&D 1/17/21; PATIENT STATES HE HAS BEEN DOING PT ON HIS OWN AT HIS HOME DUE TO THE FACT THAT HE IS UNABLE TO GET RIDES TO OUTPATIENT PT; 3/10 PAIN    Wound Check     RIGHT WRIST       OP:SURGEON: Dr. Drew Tobin MD  DATE OF PROCEDURE: 1/17/21  PROCEDURE:1.  Incision and drainage right septic wrist joint  2.  Exploration of right second MCP joint  3.  Exploration of right second PIP joint    Subjective:  Shagufta Logan is approximately 2 weeks out from the above date of surgery. Here today for removal of remaining sutures to incision line. Still taking ceftriaxone per infectious disease and has a follow-up with ID in approximately 1 month. No issues with the antibiotic. Partial weightbearing secondary to pain and stiffness of the right hand and fingers. He does receive home health nursing/wound care who have been doing dry dressings to the incision lines. Steri-Strips remain intact the distal 2 incision lines from suture removal last week. Says he has mild serous drainage to the proximal incision, no purulence, no warmth, no erythema. No new injuries or any other orthopedic complaints. States that he is still very stiff to all fingers and thumb has been doing exercises at home and this has improved somewhat over the past week. His insurance would not allow him to go to outpatient therapy while receiving home health and he must receive home health occupational therapy. Denies fever, chills, myalgias, diaphoresis.     Review of Systems -    General ROS: negative for - chills, fatigue, fever or night sweats  Respiratory ROS: no cough, shortness of breath, or wheezing  Cardiovascular ROS: no chest pain or dyspnea on exertion  Gastrointestinal ROS: no abdominal pain, nausea, vomiting, diarrhea, constipation,or black or bloody stools  Genitourinary: no hematuria, dysuria, or incontinence Electronically signed by Yuri Gauthier PA-C on 2/3/2021 at 11:37 AM  Note: This report was completed using [employer] voiced recognition software. Every effort has been made to ensure accuracy; however, inadvertent computerized transcription errors may be present.

## 2021-02-03 NOTE — PATIENT INSTRUCTIONS
Patient Education        Wrist: Exercises  Introduction  Here are some examples of exercises for you to try. The exercises may be suggPatient Education        Hand Arthritis: Exercises  Introduction  Here are some examples of exercises for you to try. The exercises may be suggested for a condition or for rehabilitation. Start each exercise slowly. Ease off the exercises if you start to have pain. You will be told when to start these exercises and which ones will work best for you. How to do the exercises  Tendon glides   1. In this exercise, the steps follow one another to a make a continuous movement. 2. With your affected hand, point your fingers and thumb straight up. Your wrist should be relaxed, following the line of your fingers and thumb. 3. Curl your fingers so that the top two joints in them are bent, and your fingers wrap down. Your fingertips should touch or be near the base of your fingers. Your fingers will look like a hook. 4. Make a fist by bending your knuckles. Your thumb can gently rest against your index (pointing) finger. 5. Unwind your fingers slightly so that your fingertips can touch the base of your palm. Your thumb can rest against your index finger. 6. Move back to your starting position, with your fingers and thumb pointing up. 7. Repeat the series of motions 8 to 12 times. 8. Switch hands and repeat steps 1 through 6, even if only one hand is sore. Intrinsic flexion   1. Rest your affected hand on a table and bend the large joints where your fingers connect to your hand. Keep your thumb and the other joints in your fingers straight. 2. Slowly straighten your fingers. Your wrist should be relaxed, following the line of your fingers and thumb. 3. Move back to your starting position, with your hand bent. 4. Repeat 8 to 12 times. 5. Switch hands and repeat steps 1 through 4, even if only one hand is sore. Finger extension   1. Place your affected hand flat on a table. 2. Lift and then lower one finger at a time off the table. 3. Repeat 8 to 12 times. 4. Switch hands and repeat steps 1 through 3, even if only one hand is sore. MP extension   1. Place your good hand on a table, palm up. Put your affected hand on top of your good hand with your fingers wrapped around the thumb of your good hand like you are making a fist.  2. Slowly uncurl the joints of your affected hand where your fingers connect to your hand so that only the top two joints of your fingers are bent. Your fingers will look like a hook. 3. Move back to your starting position, with your fingers wrapped around your good thumb. 4. Repeat 8 to 12 times. 5. Switch hands and repeat steps 1 through 4, even if only one hand is sore. PIP extension (with MP extension)   1. Place your good hand on a table, palm up. Put your affected hand on top of your good hand, palm up. 2. Use the thumb and fingers of your good hand to grasp below the middle joint of one finger of your affected hand. 3. Straighten the last two joints of that finger. 4. Repeat 8 to 12 times. 5. Repeat steps 1 through 4 with each finger. 6. Switch hands and repeat steps 1 through 5, even if only one hand is sore. DIP flexion   1. With your good hand, grasp one finger of your affected hand. Your thumb will be on the top side of your finger just below the joint that is closest to your fingernail. 2. Slowly bend your affected finger only at the joint closest to your fingernail. 3. Repeat 8 to 12 times. 4. Repeat steps 1 through 3 with each finger. 5. Switch hands and repeat steps 1 through 4, even if only one hand is sore. Follow-up care is a key part of your treatment and safety. Be sure to make and go to all appointments, and call your doctor if you are having problems. It's also a good idea to know your test results and keep a list of the medicines you take. Where can you learn more? Go to [email or web address] org and sign in to your [employer] account. Enter Y779 in the [facility] box to learn more about \"Hand Arthritis: Exercises. \"     If you do not have an account, please click on the \"Sign Up Now\" link. Current as of: March 2, 2020               Content Version: 12.6  © 2006-2020 [employer]. Care instructions adapted under license by [facility] ([facility]). If you have questions about a medical condition or this instruction, always ask your healthcare professional. [address] any warranty or liability for your use of this information. ested for a condition or for rehabilitation. Start each exercise slowly. Ease off the exercises if you start to have pain. You will be told when to start these exercises and which ones will work best for you. How to do the exercises  Prayer stretch   1. Start with your palms together in front of your chest just below your chin. 2. Slowly lower your hands toward your waistline, keeping your hands close to your stomach and your palms together until you feel a mild to moderate stretch under your forearms. 3. Hold for at least 15 to 30 seconds. Repeat 2 to 4 times. Wrist flexor stretch   1. Extend your arm in front of you with your palm up. 2. Bend your wrist, pointing your hand toward the floor. 3. With your other hand, gently bend your wrist farther until you feel a mild to moderate stretch in your forearm. 4. Hold for at least 15 to 30 seconds. Repeat 2 to 4 times. Wrist extensor stretch   1. Repeat steps 1 through 4 of the stretch above, but begin with your extended hand palm down. Follow-up care is a key part of your treatment and safety. Be sure to make and go to all appointments, and call your doctor if you are having problems. It's also a good idea to know your test results and keep a list of the medicines you take. Where can you learn more? Go to [email or web address] and sign in to your [employer] account. Enter 09122 [id number] in the [facility] box to learn more about \"Wrist: Exercises. \"     If you do not have an account, please click on the \"Sign Up Now\" link. Current as of: March 2, 2020               Content Version: 12.6  © 3905-1194 [employer], [facility]. Care instructions adapted under license by [facility] ([facility]). If you have questions about a medical condition or this instruction, always ask your healthcare professional. [place] [address] any warranty or liability for your use of this information.

## 2021-02-04 ENCOUNTER — TELEPHONE (OUTPATIENT)
Dept: ORTHOPEDIC SURGERY | Age: 51
End: 2021-02-04

## 2021-02-04 DIAGNOSIS — M00.231 STREPTOCOCCAL ARTHRITIS OF RIGHT WRIST (HCC): Primary | ICD-10-CM

## 2021-02-04 NOTE — TELEPHONE ENCOUNTER
Wesley Hameed from [facility] called requesting revision to home health order. Order pended and routed.

## 2021-02-23 ENCOUNTER — TELEPHONE (OUTPATIENT)
Dept: ORTHOPEDIC SURGERY | Age: 51
End: 2021-02-23

## 2021-02-23 NOTE — TELEPHONE ENCOUNTER
Idalmis Browne from [place] [address] called office stating patient has a 1 cm x 1 cm wound on right wrist, appears to be a \"pustule\". No redness, drainage, increased pain. Recommended cover with nonadherent dressing daily/prn. If any s/s infection occur - increased redness, purulent drainage, increased pain - call office immediately. Roosvelt Forth that we will update orders after next office visit. Verbalized understanding. Encouraged to call with questions or concerns.     Future Appointments   Date Time Provider Areli Aldridge   3/2/2021 10:45 AM ESSENCE Arellano - CNP [facility]   3/2/2021 11:30 AM SCHEDULE, SE OR[facility]

## 2021-03-02 ENCOUNTER — OFFICE VISIT (OUTPATIENT)
Dept: ORTHOPEDIC SURGERY | Age: 51
End: 2021-03-02
Payer: COMMERCIAL

## 2021-03-02 VITALS — WEIGHT: 226 LBS | BODY MASS INDEX: 30.61 KG/M2 | HEIGHT: 72 IN

## 2021-03-02 DIAGNOSIS — M00.231 STREPTOCOCCAL ARTHRITIS OF RIGHT WRIST (HCC): Primary | ICD-10-CM

## 2021-03-02 DIAGNOSIS — M25.641 JOINT STIFFNESS OF HAND, RIGHT: ICD-10-CM

## 2021-03-02 PROCEDURE — 99024 POSTOP FOLLOW-UP VISIT: CPT | Performed by: PHYSICIAN ASSISTANT

## 2021-03-02 PROCEDURE — 99212 OFFICE O/P EST SF 10 MIN: CPT | Performed by: PHYSICIAN ASSISTANT

## 2021-03-02 NOTE — PROGRESS NOTES
Patient here for a 6 week post op check to the right hand. Patient does have swelling in the right hand/wrist. Patient states that there is a small growth over top of the incision site, denies any recent drainage.              Electronically signed by Randi Siegel MA on 3/2/2021 at 11:31 AM

## 2021-03-03 NOTE — PROGRESS NOTES
Chief Complaint   Patient presents with    Post-Op Check     right hand       OP:SURGEON: Dr. Fran Harmon MD  DATE OF PROCEDURE: 1/17/21  PROCEDURE:1.  Incision and drainage right septic wrist joint  2.  Exploration of right second MCP joint  3.  Exploration of right second PIP joint    Subjective:  Nguyen Sánchez is approximately 6 weeks out from the above date of surgery. Patient saw ID earlier today, still has PICC line, states that he was told ID will give order to Devika Jin to pull PICC if appropriate. Patient is WBAT on RUE, continues to have significant stiffness to the fingers. Patient states he did have an area of his wrist incision that focally began to swell, drained fluid and has since remained dry and decreased swelling. No new injuries or any other orthopedic complaints. Patient continues with home OT but is very interested in attending outpatient OT to push further his progression, cannot attend outpatient OT until no longer receiving [facility]. Planning to go to [facility] outpatient OT. Denies fever, chills, myalgias, diaphoresis. Review of Systems -    General ROS: negative for - chills, fatigue, fever or night sweats  Respiratory ROS: no cough, shortness of breath, or wheezing  Cardiovascular ROS: no chest pain or dyspnea on exertion  Gastrointestinal ROS: no abdominal pain, nausea, vomiting, diarrhea, constipation,or black or bloody stools  Genitourinary: no hematuria, dysuria, or incontinence   Musculoskeletal ROS: negative for -back or neck pain or stiffness, also see HPI  Neurological ROS: no TIA or stroke symptoms       Objective:    General: Alert and oriented X 3, normocephalic atraumatic, external ears and eye normal, sclera clear, no acute distress, respirations easy and unlabored with no audible wheezes, skin warm and dry, speech and dress appropriate for noted age, affect euthymic.     Extremity:  Right Upper Extremity  Skin is clean dry and intact  Mild edema noted globally about the hand and all fingers and thumb  Radial pulse palpable, fingers warm with BCR  Flex/extension intact to wrist, thumb and fingers - very minimal flexion at index, middle and ringDIP and PIP joints and thumb IP joint. Finger opposition intact  Finger adduction/abduction intact  Finger crossover intact  Subjectively states sensation intact to radial/medial/ulnar distribution  Incision well healed with no redness, drainage or warmth, there is an area with hypertrophic tissue without signs of infection or drainage distal 2 incision lines look healed at this time      Ht 6' (1.829 m)   Wt 226 lb (102.5 kg)   BMI 30.65 kg/m²         Assessment:   Diagnosis Orders   1. Streptococcal arthritis of right wrist (HCC)  Amb External Referral To Occupational Therapy   2. Joint stiffness of hand, right  Amb External Referral To Occupational Therapy       Plan:  WBAT RUE  Continue with home OT and aggressive ROM until PICC line pulled  OP OT order written pending DC from home health services  Continue with ice/heat  OTC meds PRN  Patient will follow up in 4-6 weeks for repeat evaluation    Electronically signed by Rosanne Newell PA-C on 3/4/2021 at 6:05 PM  Note: This report was completed using [employer] voiced recognition software. Every effort has been made to ensure accuracy; however, inadvertent computerized transcription errors may be present.

## 2021-04-14 ENCOUNTER — OFFICE VISIT (OUTPATIENT)
Dept: ORTHOPEDIC SURGERY | Age: 51
End: 2021-04-14
Payer: COMMERCIAL

## 2021-04-14 VITALS — TEMPERATURE: 97.9 F

## 2021-04-14 DIAGNOSIS — M00.231 STREPTOCOCCAL ARTHRITIS OF RIGHT WRIST (HCC): Primary | ICD-10-CM

## 2021-04-14 PROCEDURE — 99024 POSTOP FOLLOW-UP VISIT: CPT | Performed by: PHYSICIAN ASSISTANT

## 2021-04-14 PROCEDURE — 99212 OFFICE O/P EST SF 10 MIN: CPT | Performed by: PHYSICIAN ASSISTANT

## 2021-04-14 NOTE — PATIENT INSTRUCTIONS
Infection labs are within normal limits  Off antibiotics from infectious disease    For increased pain/soreness after therapy particularly at night recommend   50 mg of Voltaren and 1000 mg of Tylenol twice daily if needed    You have been prescribed compounded pain medication from [facility] in [place], [place]. You will be contacted by pharmacy directly and this is processed through your insurance. This is to be mailed to you directly. All questions should be directed to: phone: [phone].      Continue with OT, working aggressively on motion and use of the hand for as much of daily activity as possible     Lets plan to see you back in approximately 4 weeks for reassessment of motion

## 2021-04-14 NOTE — PROGRESS NOTES
Tala Escobar is a 44 y.o. male who presents for follow up of right hand & wrist    SURGEON: Dr. Shon Phelan MD  Date of Injury/Surgery: 1/17/2021  Date last seen in office: 3/2/2021    Symptoms: better  New complaints: patient experiencing cracking in the right thumb for approximately 1 week      Incision(s): Edges approximated no drainage noted no redness no swelling    Weightbearing: right upper Weight bearing as tolerated      Assistive device No Device  Participating in therapy (location if yes)? Yes, [place]     Refills Needed: None Would like to discuss low dose pain medication for after therapy.     Order/Referral Needed: possible new PT

## 2021-04-18 NOTE — PROGRESS NOTES
Chief Complaint   Patient presents with    Post-Op Check     Streptococcal arthritis of right wrist [facility])       OP:SURGEON: Dr. Ishmael White MD  DATE OF PROCEDURE: 1/17/21  PROCEDURE:1.  Incision and drainage right septic wrist joint  2.  Exploration of right second MCP joint  3.  Exploration of right second PIP joint    Subjective:  Belle James is approximately 12 weeks out from the above date of surgery. No longer on antibiotics. Patient is WBAT on RUE, continues to have stiffness to the fingers but this has improved with therapy. No recurrent swelling or overlying changes to the wrist incision. No new injuries or any other orthopedic complaints. Participating in outpatient OT. Has had a popping sensation to the thumb for the last 1-2 weeks, no pain with this sensation. Patient does have increased pain to the right hand/wrist after therapy, notably at bed. Has voltaren prescribed for other pain and takes very sparingly. Denies fever, chills, myalgias, diaphoresis. Review of Systems -    General ROS: negative for - chills, fatigue, fever or night sweats  Respiratory ROS: no cough, shortness of breath, or wheezing  Cardiovascular ROS: no chest pain or dyspnea on exertion  Gastrointestinal ROS: no abdominal pain, nausea, vomiting, diarrhea, constipation,or black or bloody stools  Genitourinary: no hematuria, dysuria, or incontinence   Musculoskeletal ROS: negative for -back or neck pain or stiffness, also see HPI  Neurological ROS: no TIA or stroke symptoms       Objective:    General: Alert and oriented X 3, normocephalic atraumatic, external ears and eye normal, sclera clear, no acute distress, respirations easy and unlabored with no audible wheezes, skin warm and dry, speech and dress appropriate for noted age, affect euthymic.     Extremity:  Right Upper Extremity  Skin is clean dry and intact  Mild edema noted to the fingers  Radial pulse palpable, fingers warm with BCR  Flex/extension intact to wrist,

## 2021-04-26 DIAGNOSIS — M00.231 STREPTOCOCCAL ARTHRITIS OF RIGHT WRIST (HCC): ICD-10-CM

## 2021-04-26 DIAGNOSIS — M25.641 JOINT STIFFNESS OF HAND, RIGHT: Primary | ICD-10-CM

## 2021-04-27 RX ORDER — CREAM BASE NO.39
CREAM (GRAM) TOPICAL
Qty: 100 G | Refills: 0 | Status: SHIPPED | OUTPATIENT
Start: 2021-04-27 | End: 2021-05-27

## 2021-05-12 ENCOUNTER — OFFICE VISIT (OUTPATIENT)
Dept: ORTHOPEDIC SURGERY | Age: 51
End: 2021-05-12
Payer: COMMERCIAL

## 2021-05-12 VITALS — TEMPERATURE: 98.4 F

## 2021-05-12 DIAGNOSIS — M00.231 STREPTOCOCCAL ARTHRITIS OF RIGHT WRIST (HCC): Primary | ICD-10-CM

## 2021-05-12 PROCEDURE — 99213 OFFICE O/P EST LOW 20 MIN: CPT | Performed by: PHYSICIAN ASSISTANT

## 2021-05-12 PROCEDURE — 99212 OFFICE O/P EST SF 10 MIN: CPT

## 2021-05-12 NOTE — PROGRESS NOTES
Missy Silva is a 44 y.o. male who presents for follow up of Abcess right hand and wrist 1-17-21    SURGEON: Dr. Jono Cabrera MD  Date of Injury/Surgery: 1-17-21  (Had developed infection over a three to four  day period prior to gong to ER)  Date last seen in office: 4-18-21    Symptoms: better Describes \"tightness\" only  New complaints:  none    Weightbearing:  Partial.      Assistive device no  Participating in therapy (location if yes)?  Yes  [place] therapy    Refills Needed: : no

## 2021-05-12 NOTE — PROGRESS NOTES
Chief Complaint   Patient presents with    Follow-up      Right hand wrist abcess. OP:SURGEON: Dr. Shira Lance MD  DATE OF PROCEDURE: 1-17-21  PROCEDURE: 1. I&D right septic wrist joint  2. Exploration of right second MCP joint  3. Exploration of right second PIP joint    POD: 4 months    Subjective:  Jimmy Naranjo is following up from the above surgery. He is WBAT on right upper extremity. He ambulates with no assistive device. Pain to extremity is mild and is not taking prescribed pain medication. They denies numbness or tingling to the right upper extremity. Denies calf pain, chest pain, or shortness of breath. Patient is participating in therapy, outpatient therapy. He states the swelling is going down in his right hand. He does still complain of decreased motion and weakness in his hand and fingers although this is improving in hand therapy. He denies fever, chills or night sweats. He denies redness, warmth or tenderness in his hand or fingers. He is using NSAIDs for pain as well as a compounding cream on the right hand to help with pain and stiffness. Review of Systems -    General ROS: negative for - chills, fatigue, fever or night sweats  Respiratory ROS: no cough, shortness of breath, or wheezing  Cardiovascular ROS: no chest pain or dyspnea on exertion  Gastrointestinal ROS: no abdominal pain, nausea, vomiting, diarrhea, constipation,or black or bloody stools  Genitourinary: no hematuria, dysuria, or incontinence   Musculoskeletal ROS: negative for -back or neck pain or stiffness, also see HPI  Neurological ROS: no TIA or stroke symptoms       Objective:    General: Alert and oriented X 3, normocephalic atraumatic, external ears and eye normal, sclera clear, no acute distress, respirations easy and unlabored with no audible wheezes, skin warm and dry, speech and dress appropriate for noted age, affect euthymic.     Extremity:  Right Upper Extremity  Skin is clean dry and intact   mild edema noted over the digits  1-2+ Radial pulse, fingers warm with BCR  Flex/extension intact to wrist, thumb and fingers. Finger opposition intact  Finger adduction/abduction intact  Finger crossover intact  unable to make concentric fist although this is improving. He does still have some limitation with flexion at the MCP, PIP and DIP joints due to mild swelling and stiffness. Subjectively states sensation to Median Nerve, Ulnar Nerve and Radial Nerve distribution  Incision(s) well-healed. No redness, warmth or tenderness. Temp 98.4 °F (36.9 °C) (Oral)     XR:   X-rays not taken at today's visit. Assessment:   Diagnosis Orders   1. Streptococcal arthritis of right wrist [facility])         Plan:  Patient will continue in physical/occupational therapy working on range of motion and strengthening of his right wrist and hand. He is making progress. Continue using the compounding cream on the right hand to help with pain and stiffness. Patient states he has enough of the cream to last for a few more weeks. Advised him to contact the office if he needs more and this can be called in. Continue NSAIDs for pain. Continue weightbearing as tolerated on the right upper extremity. Follow-up in 8 weeks for reevaluation. Call with any questions or concerns. Electronically signed by MARYBETH Dwyer on 5/12/2021 at 2:34 PM  Note: This report was completed using [employer] voiced recognition software. Every effort has been made to ensure accuracy; however, inadvertent computerized transcription errors may be present.
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
## 2021-06-02 ENCOUNTER — TELEPHONE (OUTPATIENT)
Dept: ORTHOPEDIC SURGERY | Age: 51
End: 2021-06-02

## 2021-06-02 DIAGNOSIS — M25.641 JOINT STIFFNESS OF HAND, RIGHT: ICD-10-CM

## 2021-06-02 DIAGNOSIS — M00.231 STREPTOCOCCAL ARTHRITIS OF RIGHT WRIST (HCC): Primary | ICD-10-CM

## 2021-06-02 NOTE — TELEPHONE ENCOUNTER
Patient's PT called asking for an order for a flexion assist splint for the right small finger.     Fax Number is [phone]

## 2024-09-25 NOTE — TELEPHONE ENCOUNTER
Sherman Salguero, Visiting Nurse, Called asking for incision care for the patient. Spoke with Peggy Smyth PA-C and instructed Sherman Salguero that patient is to be doing daily Gauze, ABD changes daily, and also changing the Ace bandage when needed. 3 = A little assistance

## (undated) DEVICE — DOUBLE BASIN SET: Brand: MEDLINE INDUSTRIES, INC.

## (undated) DEVICE — GOWN,BREATHABLE,IMP SLV 3XL AURORA: Brand: MEDLINE

## (undated) DEVICE — INTENDED FOR TISSUE SEPARATION, AND OTHER PROCEDURES THAT REQUIRE A SHARP SURGICAL BLADE TO PUNCTURE OR CUT.: Brand: BARD-PARKER ® STAINLESS STEEL BLADES

## (undated) DEVICE — 3M™ STERI-DRAPE™ U-DRAPE 1015: Brand: STERI-DRAPE™

## (undated) DEVICE — Z DISCONTINUED USE 2275686 GLOVE SURG SZ 8 L12IN FNGR THK13MIL WHT ISOLEX POLYISOPRENE

## (undated) DEVICE — SOLUTION IV IRRIG POUR BRL 0.9% SODIUM CHL 2F7124

## (undated) DEVICE — DRIP REDUCTION MANIFOLD

## (undated) DEVICE — PATIENT RETURN ELECTRODE, SINGLE-USE, CONTACT QUALITY MONITORING, ADULT, WITH 9FT CORD, FOR PATIENTS WEIGING OVER 33LBS. (15KG): Brand: MEGADYNE

## (undated) DEVICE — SURGICAL PROCEDURE PACK HND

## (undated) DEVICE — SWAB SPEC COLL SHFT L5.25IN POLYUR FOAM TIP SFT DBL MEDIA

## (undated) DEVICE — ELECTROSURGICAL PENCIL BUTTON SWITCH E-Z CLEAN COATED BLADE ELECTRODE 10 FT (3 M) CORD HOLSTER: Brand: MEGADYNE

## (undated) DEVICE — GLOVE SURG SZ 8 L12IN FNGR THK79MIL GRN LTX FREE

## (undated) DEVICE — Z INACTIVE USE 2660664 SOLUTION IRRIG 3000ML 0.9% SOD CHL USP UROMATIC PLAS CONT

## (undated) DEVICE — PADDING,UNDERCAST,COTTON, 4"X4YD STERILE: Brand: MEDLINE

## (undated) DEVICE — GOWN,AURORA,BRTHSLV,2XL,18/CS: Brand: MEDLINE

## (undated) DEVICE — SOLUTION IV IRRIG WATER 1000ML POUR BRL 2F7114

## (undated) DEVICE — ZIMMER® STERILE DISPOSABLE TOURNIQUET CUFF WITH PROTECTIVE SLEEVE AND PLC, DUAL PORT, SINGLE BLADDER, 18 IN. (46 CM)

## (undated) DEVICE — SET IRR L100IN DIA0.280IN C100ML 2 NVENT PIERCING PINS 3

## (undated) DEVICE — GOWN,BREATHABLE SLV,AURORA,XLG,STRL: Brand: MEDLINE

## (undated) DEVICE — READY WET SKIN SCRUB TRAY-LF: Brand: MEDLINE INDUSTRIES, INC.

## (undated) DEVICE — CONTAINER VACUTAINER ANAER CULTURE SWAB

## (undated) DEVICE — BANDAGE COMPR W4INXL10YD WHITE/BEIGE E MTRX HK LOOP CLSR

## (undated) DEVICE — Z DISCONTINUED NO SUB IDED DRAIN PENROSE L12IN 0.25IN USED TO PROMOTE DRNAGE IN OPN

## (undated) DEVICE — GOWN,REINF,POLY,ECL,PP SLV,XXL: Brand: MEDLINE

## (undated) DEVICE — TRAP,MUCUS SPECIMEN,40CC: Brand: MEDLINE

## (undated) DEVICE — TOWEL,OR,DSP,ST,BLUE,DLX,10/PK,8PK/CS: Brand: MEDLINE

## (undated) DEVICE — CLOTH SURG PREP PREOPERATIVE CHLORHEXIDINE GLUC 2% READYPREP

## (undated) DEVICE — 3M™ COBAN™ NL STERILE NON-LATEX SELF-ADHERENT WRAP, 2084S, 4 IN X 5 YD (10 CM X 4,5 M), 18 ROLLS/CASE: Brand: 3M™ COBAN™